# Patient Record
Sex: FEMALE | Race: WHITE | NOT HISPANIC OR LATINO | Employment: FULL TIME | URBAN - METROPOLITAN AREA
[De-identification: names, ages, dates, MRNs, and addresses within clinical notes are randomized per-mention and may not be internally consistent; named-entity substitution may affect disease eponyms.]

---

## 2017-11-25 ENCOUNTER — OFFICE VISIT (OUTPATIENT)
Dept: URGENT CARE | Facility: URGENT CARE | Age: 62
End: 2017-11-25
Payer: COMMERCIAL

## 2017-11-25 VITALS
TEMPERATURE: 97.8 F | OXYGEN SATURATION: 98 % | DIASTOLIC BLOOD PRESSURE: 80 MMHG | BODY MASS INDEX: 28.15 KG/M2 | HEART RATE: 76 BPM | WEIGHT: 164 LBS | RESPIRATION RATE: 16 BRPM | SYSTOLIC BLOOD PRESSURE: 120 MMHG

## 2017-11-25 DIAGNOSIS — J06.9 VIRAL URI WITH COUGH: Primary | ICD-10-CM

## 2017-11-25 LAB
DEPRECATED S PYO AG THROAT QL EIA: NORMAL
SPECIMEN SOURCE: NORMAL

## 2017-11-25 PROCEDURE — 99213 OFFICE O/P EST LOW 20 MIN: CPT | Performed by: FAMILY MEDICINE

## 2017-11-25 PROCEDURE — 87880 STREP A ASSAY W/OPTIC: CPT | Performed by: PHYSICIAN ASSISTANT

## 2017-11-25 PROCEDURE — 87081 CULTURE SCREEN ONLY: CPT | Performed by: PHYSICIAN ASSISTANT

## 2017-11-25 RX ORDER — BENZONATATE 200 MG/1
100-200 CAPSULE ORAL 3 TIMES DAILY PRN
Qty: 21 CAPSULE | Refills: 0 | Status: SHIPPED | OUTPATIENT
Start: 2017-11-25 | End: 2019-02-04

## 2017-11-25 RX ORDER — PROMETHAZINE HYDROCHLORIDE AND CODEINE PHOSPHATE 6.25; 1 MG/5ML; MG/5ML
5 SYRUP ORAL 4 TIMES DAILY PRN
Qty: 280 ML | Refills: 0 | Status: SHIPPED | OUTPATIENT
Start: 2017-11-25 | End: 2019-02-04

## 2017-11-25 NOTE — PATIENT INSTRUCTIONS
Continue Dayquil and Nyquil    Take tessalon for additional help with cough    Try pherergan with codeine - caution as this can cause sedation    Continue good oral hydration with fluids    If no improvement in the next 3-5 days return for evaluation

## 2017-11-25 NOTE — MR AVS SNAPSHOT
"              After Visit Summary   11/25/2017    Kim Sparrow    MRN: [id number]           Patient Information     Date Of Birth          1955        Visit Information        Provider Department      11/25/2017 1:25 PM Earl Beaulieu MD [facility]        Today's Diagnoses     Viral URI with cough    -  1    Acute pharyngitis, unspecified etiology          Care Instructions    Continue Dayquil and Nyquil    Take tessalon for additional help with cough    Try pherergan with codeine - caution as this can cause sedation    Continue good oral hydration with fluids    If no improvement in the next 3-5 days return for evaluation          Follow-ups after your visit        Who to contact     If you have questions or need follow up information about today's clinic visit or your schedule please contact [facility] directly at [phone].  Normal or non-critical lab and imaging results will be communicated to you by CC videohart, letter or phone within 4 business days after the clinic has received the results. If you do not hear from us within 7 days, please contact the clinic through CC videohart or phone. If you have a critical or abnormal lab result, we will notify you by phone as soon as possible.  Submit refill requests through "[employer] or call your pharmacy and they will forward the refill request to us. Please allow 3 business days for your refill to be completed.          Additional Information About Your Visit        MyChart Information     "[employer] lets you send messages to your doctor, view your test results, renew your prescriptions, schedule appointments and more. To sign up, go to [email or web address] . Click on \"Log in\" on the left side of the screen, which will take you to the Welcome page. Then click on \"Sign up Now\" on the right side of the page.     You will be asked to enter the access code listed below, as well as some personal information. Please follow the " directions to create your username and password.     Your access code is: [email or web address]  Expires: 2018  2:06 PM     Your access code will  in 90 days. If you need help or a new code, please call your [place] clinic or [phone].        Care EveryWhere ID     This is your Care EveryWhere ID. This could be used by other organizations to access your [place] medical records  [id number]        Your Vitals Were     Pulse Temperature Respirations Pulse Oximetry BMI (Body Mass Index)       76 97.8  F (36.6  C) (Oral) 16 98% 28.15 kg/m2        Blood Pressure from Last 3 Encounters:   17 120/80   16 114/70   02/11/15 122/75    Weight from Last 3 Encounters:   17 164 lb (74.4 kg)   16 164 lb 11.2 oz (74.7 kg)   02/11/15 158 lb 4.8 oz (71.8 kg)              We Performed the Following     Beta strep group A culture     Rapid strep screen          Today's Medication Changes          These changes are accurate as of: 17  2:06 PM.  If you have any questions, ask your nurse or doctor.               Start taking these medicines.        Dose/Directions    benzonatate 200 MG capsule   Commonly known as:  TESSALON   Used for:  Viral URI with cough   Started by:  Earl Beaulieu MD        Dose:  100-200 mg   Take 0.5-1 capsules (100-200 mg) by mouth 3 times daily as needed for cough   Quantity:  21 capsule   Refills:  0       promethazine-codeine 6.25-10 MG/5ML syrup   Commonly known as:  PHENERGAN with codeine   Used for:  Viral URI with cough   Started by:  Earl Beaulieu MD        Dose:  5 mL   Take 5 mLs by mouth 4 times daily as needed for cough   Quantity:  280 mL   Refills:  0            Where to get your medicines      These medications were sent to [id number] - [place], [place] - [address]  [address] [address]     Phone:  [phone]     benzonatate 200 MG capsule         Some of these will need a paper prescription and others can be  bought over the counter.  Ask your nurse if you have questions.     Bring a paper prescription for each of these medications     promethazine-codeine 6.25-10 MG/5ML syrup                Primary Care Provider Office Phone # Fax #    Eun Harper [phone] [address] [place] [address]        Equal Access to Services     [facility]: Hadii aad ku hadasho Soomaali, waaxda luqadaha, qaybta kaalmada adeegyada, waxay idiin hayaan adeeg kharash la'aan . So [facility] [phone].    ATENCIÓN: Si habla español, tiene a champion disposición servicios gratuitos de asistencia lingüística. Llame al [phone].    We comply with applicable federal civil rights laws and [place] laws. We do not discriminate on the basis of race, color, national origin, age, disability, sex, sexual orientation, or gender identity.            Thank you!     Thank you for choosing [facility]  for your care. Our goal is always to provide you with excellent care. Hearing back from our patients is one way we can continue to improve our services. Please take a few minutes to complete the written survey that you may receive in the mail after your visit with us. Thank you!             Your Updated Medication List - Protect others around you: Learn how to safely use, store and throw away your medicines at www.disposemymeds.org.          This list is accurate as of: 11/25/17  2:06 PM.  Always use your most recent med list.                   Brand Name Dispense Instructions for use Diagnosis    aspirin 81 MG tablet     30 tablet    Take 1 tablet (81 mg) by mouth daily        benzonatate 200 MG capsule    TESSALON    21 capsule    Take 0.5-1 capsules (100-200 mg) by mouth 3 times daily as needed for cough    Viral URI with cough       METAMUCIL 0.52 G capsule   Generic drug:  psyllium     540 capsule    Take 1 capsule (0.52 g) by mouth daily        promethazine-codeine 6.25-10 MG/5ML syrup    PHENERGAN  with codeine    280 mL    Take 5 mLs by mouth 4 times daily as needed for cough    Viral URI with cough

## 2017-11-25 NOTE — NURSING NOTE
"Kim Sparrow is a 61 year old female.      Chief Complaint   Patient presents with     Urgent Care     Sick     pt is here for a ST and cold that started last Sunday -pain in her ribs for coughing and back pain       Initial /80 (BP Location: Right arm, Cuff Size: Adult Large)  Pulse 76  Temp 97.8  F (36.6  C) (Oral)  Resp 16  Wt 164 lb (74.4 kg)  SpO2 98%  BMI 28.15 kg/m2 Estimated body mass index is 28.15 kg/(m^2) as calculated from the following:    Height as of 6/9/16: 5' 4\" (1.626 m).    Weight as of this encounter: 164 lb (74.4 kg).  Medication Reconciliation: complete      Questioned patient about current smoking habits.  Pt. quit smoking some time ago.      Aleida Hernandez CMA      "

## 2017-11-25 NOTE — PROGRESS NOTES
Subjective:   Kim Sparrow is a 61 year old female who presents for   Chief Complaint   Patient presents with     Urgent Care     Sick     pt is here for a ST and cold that started last Sunday -pain in her ribs for coughing and back pain   runny nose on Sunday heron tprogressed to cold-like symptoms weds and have been worsening.  Coughing does cause discomfort especially to her ribs but no shortness of breath. Not take any other medications for pain other than the tylenol that comes with the dayquil. No fevers or myalgias.   Did have wheeziness last night while talking and then laying down.   Taking nyquil which helps her achieve good sleep.    is also sick with similar symptoms.     SH: non-smoker  PMHX/PSHX/MEDS/ALLERGIES/SHX/FHX reviewed and updated in Epic.    Patient Active Problem List    Diagnosis Date Noted     Adenomatous colon polyp 11/19/2014     Priority: Medium     Return for colonoscopy 2019       Diverticula of colon 10/29/2014     Priority: Medium     Diverticulitis 08/06/2014     Priority: Medium     Diverticulitis of colon 09/07/2012     Priority: Medium     Advanced directives, counseling/discussion 08/04/2011     Priority: Medium     Advance Directive Problem List Overview:   Name Relationship Phone    Primary Health Care Agent            Alternative Health Care Agent          Discussed advance care planning with patient; however, patient declined at this time. 8/4/2011        Breast radiologic abnormality 08/04/2011     Priority: Medium     Chronic left breast calcification; follows with [facility]       HYPERLIPIDEMIA LDL GOAL <130 02/10/2010     Priority: Medium       Current Outpatient Prescriptions   Medication     psyllium (METAMUCIL) 0.52 G capsule     aspirin 81 MG tablet     benzonatate (TESSALON) 200 MG capsule     promethazine-codeine (PHENERGAN WITH CODEINE) 6.25-10 MG/5ML syrup     No current facility-administered medications for this visit.      ROS:  As above per  HPI    Objective:   /80 (BP Location: Right arm, Cuff Size: Adult Large)  Pulse 76  Temp 97.8  F (36.6  C) (Oral)  Resp 16  Wt 164 lb (74.4 kg)  SpO2 98%  BMI 28.15 kg/m2, Body mass index is 28.15 kg/(m^2).  Gen:  NAD, well-nourished, sitting in chair comfortably  HEENT: EOMI, PERRL sclera anicteric, Head normocephalic, ; nares patent; oropharynx pink and moist, minimal redness of the right side of her posterior pharynx, uvula is midline and no evidence of trismus  Neck: trachea midline  CV:  RRR  - no murmurs, rubs, or gallups,   Pulm:  CTAB with good aeration in all lung fields, no wheezes/rales/rhonchi, no increased work of breathing   Extrem: no cyanosis, edema or clubbing  Skin: no obvious rashes or abnormalities  Psych: Euthymic, linear thoughts, normal rate of speech       Results for orders placed or performed in visit on 11/25/17   Rapid strep screen   Result Value Ref Range    Specimen Description Throat     Rapid Strep A Screen       NEGATIVE: No Group A streptococcal antigen detected by immunoassay, await culture report.     Assessment & Plan:   Kim Sparrow, 61 year old female who presents with:    Viral URI with cough  Pain with cough is quite violent for her, I'll prescribe phenergan with codeine (QID PRN) to help with pain relief, we discussed the side effects including drowsiness. Tessalon was also given as another backup to help reduce cough symptoms. Normal vital signs here and no evidence of pneumonia. Continue good oral hydration along with dayquil/nyquil. Less likely to be flu and if it were to be this would be a mild presentation, also out of treatment window for oseltamivir if so.   - benzonatate (TESSALON) 200 MG capsule  Dispense: 21 capsule; Refill: 0  - promethazine-codeine (PHENERGAN WITH CODEINE) 6.25-10 MG/5ML syrup  Dispense: 280 mL; Refill: 0    Earl Beaulieu MD PGY3  [phone] (Pager)  [facility] [place]    Options for treatment and/or follow-up care were  reviewed with the patient. Kim Sparrow and/or legal guardian was engaged and actively involved in the decision making process. Patient/guardian verbalized understanding of the options discussed and was satisfied with the final plan.

## 2017-11-26 LAB
BACTERIA SPEC CULT: NORMAL
SPECIMEN SOURCE: NORMAL

## 2018-02-19 ENCOUNTER — TELEPHONE (OUTPATIENT)
Dept: PEDIATRICS | Facility: CLINIC | Age: 63
End: 2018-02-19

## 2018-02-19 DIAGNOSIS — S63.259S DISLOCATION OF FINGER, SEQUELA: Primary | ICD-10-CM

## 2018-02-19 NOTE — TELEPHONE ENCOUNTER
Patient calling that she had dislocated fingers and stitches. States when she called the [facility] they felt she should see a hand surgeon. Please advise. [phone] ok to lizz.

## 2018-02-22 ENCOUNTER — TRANSFERRED RECORDS (OUTPATIENT)
Dept: HEALTH INFORMATION MANAGEMENT | Facility: CLINIC | Age: 63
End: 2018-02-22

## 2018-03-01 ENCOUNTER — TRANSFERRED RECORDS (OUTPATIENT)
Dept: HEALTH INFORMATION MANAGEMENT | Facility: CLINIC | Age: 63
End: 2018-03-01

## 2018-03-22 ENCOUNTER — TRANSFERRED RECORDS (OUTPATIENT)
Dept: HEALTH INFORMATION MANAGEMENT | Facility: CLINIC | Age: 63
End: 2018-03-22

## 2019-01-10 ENCOUNTER — TELEPHONE (OUTPATIENT)
Dept: ONCOLOGY | Facility: CLINIC | Age: 64
End: 2019-01-10

## 2019-01-10 NOTE — TELEPHONE ENCOUNTER
ONCOLOGY INTAKE: Records Information      APPT INFORMATION:  Referring provider:  Self Referred, seeking 2nd Opinion  Referring provider s clinic:  Pt of [facility]  Reason for visit/diagnosis:  Lung Cancer    Were the records received with the referral (via [employer])? No, sb pt    Has patient been seen for any external appt for this diagnosis (enter clinic/location)? [facility] only

## 2019-02-01 NOTE — TELEPHONE ENCOUNTER
RECORDS STATUS - ALL OTHER DIAGNOSIS      RECORDS RECEIVED FROM: [facility] in [facility]   DATE RECEIVED: 2/2/19   NOTES STATUS DETAILS   OFFICE NOTE from referring provider 1/30/19 In [facility]     OFFICE NOTE from medical oncologist 1/31/19 In [facility]     DISCHARGE SUMMARY from hospital In [facility] In [facility]     DISCHARGE REPORT from the ER In [facility] In [facility]     OPERATIVE REPORT 1/16/19 CT Lung Bx  12/31/18 IR Port Placement In Epic/CE  In Epic/CE     MEDICATION LIST In Epic/CE In [facility]     CLINICAL TRIAL TREATMENTS TO DATE In [facility] In [facility]     LABS     PATHOLOGY REPORTS 1/16/19  In Epic/CE     ANYTHING RELATED TO DIAGNOSIS 1/16/19 XR Chest  12/12/18 PFT In Epic/CE  In Epic/CE     GENONOMIC TESTING     TYPE: In Epic/CE In Epic/CE   IMAGING (NEED IMAGES & REPORT)     CT SCANS 1/16/19 CT Lung Bx  1/8/19 CT Chest  12/12/18 Chest In Epic/CE  In [facility]  In [facility]   MRI 12/17/18 Brain In Epic/CE   MAMMO 3/27/18 In Epic/CE   ULTRASOUND None None   PET 12/21/18 In Epic/CE

## 2019-02-04 ENCOUNTER — PRE VISIT (OUTPATIENT)
Dept: ONCOLOGY | Facility: CLINIC | Age: 64
End: 2019-02-04

## 2019-02-04 ENCOUNTER — ONCOLOGY VISIT (OUTPATIENT)
Dept: ONCOLOGY | Facility: CLINIC | Age: 64
End: 2019-02-04
Attending: INTERNAL MEDICINE
Payer: COMMERCIAL

## 2019-02-04 VITALS
RESPIRATION RATE: 16 BRPM | DIASTOLIC BLOOD PRESSURE: 82 MMHG | BODY MASS INDEX: 26.29 KG/M2 | HEART RATE: 103 BPM | WEIGHT: 154 LBS | HEIGHT: 64 IN | SYSTOLIC BLOOD PRESSURE: 120 MMHG | OXYGEN SATURATION: 97 %

## 2019-02-04 DIAGNOSIS — C34.91 ADENOCARCINOMA OF RIGHT LUNG (H): ICD-10-CM

## 2019-02-04 PROCEDURE — G0463 HOSPITAL OUTPT CLINIC VISIT: HCPCS

## 2019-02-04 PROCEDURE — 99205 OFFICE O/P NEW HI 60 MIN: CPT | Performed by: INTERNAL MEDICINE

## 2019-02-04 RX ORDER — DEXAMETHASONE 4 MG/1
TABLET ORAL
Refills: 0 | COMMUNITY
Start: 2019-01-22 | End: 2022-06-08

## 2019-02-04 RX ORDER — PROCHLORPERAZINE MALEATE 10 MG
10 TABLET ORAL
COMMUNITY
Start: 2019-01-29 | End: 2022-06-08

## 2019-02-04 RX ORDER — LORAZEPAM 0.5 MG/1
TABLET ORAL
COMMUNITY
Start: 2018-12-20 | End: 2023-01-01

## 2019-02-04 RX ORDER — OXYCODONE HYDROCHLORIDE 5 MG/1
5 TABLET ORAL EVERY 6 HOURS PRN
COMMUNITY
Start: 2019-01-29 | End: 2023-01-01

## 2019-02-04 RX ORDER — ONDANSETRON 8 MG/1
4 TABLET, FILM COATED ORAL DAILY PRN
COMMUNITY
Start: 2019-01-29

## 2019-02-04 ASSESSMENT — MIFFLIN-ST. JEOR: SCORE: 1238.54

## 2019-02-04 ASSESSMENT — PAIN SCALES - GENERAL: PAINLEVEL: SEVERE PAIN (6)

## 2019-02-04 NOTE — TELEPHONE ENCOUNTER
MEET @ [facility] [facility] re: img's     Spoke w/ [place] @ [employer] - request not rec'd Refaxed request.   11:48 AM

## 2019-02-04 NOTE — Clinical Note
2/4/2019         RE: Kim Sparrow  [address] [place] [address]        Dear Colleague,    Thank you for referring your patient, Kim Sparrow, to the [facility]. Please see a copy of my visit note below.    Visit Date:   02/04/2019      This is a self referral for lung cancer.      Mrs. Sparrow is a 63-year-old female with lung adenocarcinoma.  Patient is getting her care in [facility].  She would like a second opinion.  Investigations are reviewed and summarized below.   1.  Chest x-ray on 12/10/2018 for cough and chest discomfort revealed a nodular opacity in right upper lobe.     2.  CT chest on 12/12/2018 revealed 3.9 cm right upper lobe mass abutting the minor and major fissure with extensive right-sided pleural nodularity.   3.  MRI brain on 12/17/2018 did not reveal any evidence of brain metastasis.   4.  Bronchoscopy and biopsy was done on 12/18/2018.  Transbronchial biopsy of right upper lobe mass revealed pulmonary adenocarcinoma.   -PD-L1 negative.   5.  PET scan was done on 12/21/2018.  It reveals hypermetabolic right upper lobe mass abutting the major and minor fissure.  There is small area of nodularity that appeared to be pleural based along the right middle lobe with moderate activity.  No lymph node metastasis.  No evidence of bone metastasis.  No metastatic disease in the abdomen or pelvis.   6.  EBUS was done on 12/27/2018.  Subcarinal (station 7) and right pretracheal (station R4) is positive for malignancy.   7.  CT-guided biopsy of right lung mass was done on 01/16/2019.  Pathology reveals moderately differentiated adenocarcinoma.   -PD-L1 staining is 1+. 15% staining.   -No EGFR mutation of exon 18,19 and 21.  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      Patient was seen by medical oncologist and radiation oncologist.  Patient was staged as stage III (T2 N2 M0) lung adenocarcinoma.  Patient has been started on concurrent chemoradiation.  She is getting weekly  carboplatin and Taxol with radiation.  She is tolerating the treatment well.      REVIEW OF SYSTEMS:  Overall, she is doing good.  No headache.  No dizziness.  No visual problem.  No chest pain.  No abdominal pain, nausea or vomiting.  Appetite is good.  No urinary or bowel complaints.  No bleeding.  No fever, chills or night sweats.  No recent weight loss.  All other review of systems negative.      ALLERGIES:  NONE.      MEDICATIONS:  Reviewed.      PAST MEDICAL HISTORY:   1.  Stage III right lung adenocarcinoma as described above.   2.  Hypercholesterolemia.   3.  Hyperlipidemia.   4.  Diverticulitis.   5.  Tubal ligation.   6.  Colectomy.        SOCIAL HISTORY:    -She is .   -No history of smoking.   -Occasional alcohol use.      FAMILY HISTORY:   -No history of cancer in her parents or siblings.      PHYSICAL EXAMINATION:   GENERAL:  She is alert and oriented x 3.   VITAL SIGNS:  Reviewed.  ECOG PS of 1.   Rest of the systems not examined.      LABORATORY DATA:  Reviewed from Care Everywhere.      ASSESSMENT:  A 63-year-old female with a stage IIIA (T2 N2 M0) right lung adenocarcinoma.      RECOMMENDATIONS:   1.  I had a long discussion with the patient and her family members.  I reviewed the result of CT scan, brain MRI.  PET scan.  Pathology was explained in detail.  Patient has lung adenocarcinoma.  Based on the scans, she has stage 3 A disease.  On the PET scan, there is some nodularity seen on the pleura. ? malignant or nonspecific. That can change the staging.    2.  I had a long discussion regarding treatment. Patient has stage III disease.  Surgery is generally not recommended for stage III disease. Stage III is treated with chemo-radiation.      Patient has been started on concurrent chemoradiation.  I agree with the current treatment plan.  I explained to them that concurrent chemoradiation is standard treatment for stage III lung adenocarcinoma.  There are different chemotherapy, which is  used with radiation. Patient is on weekly carboplatin and Taxol.  She is tolerating well.       After the patient is done with concurrent chemoradiation, she will be getting 1 year of immunotherapy with durvalumab.  Based on the PACIFIC trial published in [place] Journal of Medicine, one year of durvalumab improves both  progression-free survival.        I explained to the patient that this treatment will be curative in about one-third of the patients. I am hoping that she will do well without recurrence. If she recur, further treatment will be given.      We also discussed regarding treatment clinical trial.  At this time, we do not have any clinical trial available for her.      3.  Patient and family had multiple questions, which were all answered. Patient will continue her treatment under the care of Dr. Lai in [facility] system.      Total face-to-face time spent 60 minutes, more than 50% of the time spent in counseling and coordination of care.         NATHAN SOLANO MD             D: 02/10/2019   T: 02/10/2019   MT: PJ      Name:     MARY KAY STAUFFER   MRN:      [id number]        Account:      [id number]   :      1955           Visit Date:   2019      Document: [id number]        Again, thank you for allowing me to participate in the care of your patient.        Sincerely,        Nathan Solano MD

## 2019-02-05 NOTE — TELEPHONE ENCOUNTER
Received path slides from [facility] & sent to [facility] path dept for review.    Case #OO-   (1 slide, 1 report)  Case #OO- (4 slides, 1 report)  Case #SS-  (23 slides, 1 report)

## 2019-02-06 PROCEDURE — 00000346 ZZHCL STATISTIC REVIEW OUTSIDE SLIDES TC 88321: Performed by: INTERNAL MEDICINE

## 2019-02-07 LAB — COPATH REPORT: NORMAL

## 2019-02-08 LAB — COPATH REPORT: NORMAL

## 2019-02-10 PROBLEM — C34.91 ADENOCARCINOMA OF RIGHT LUNG (H): Status: ACTIVE | Noted: 2019-02-10

## 2019-02-10 NOTE — PROGRESS NOTES
Visit Date:   02/04/2019      This is a self referral for lung cancer.      Mrs. Sparrow is a 63-year-old female with lung adenocarcinoma.  Patient is getting her care in [facility].  She would like a second opinion.  Investigations are reviewed and summarized below.   1.  Chest x-ray on 12/10/2018 for cough and chest discomfort revealed a nodular opacity in right upper lobe.     2.  CT chest on 12/12/2018 revealed 3.9 cm right upper lobe mass abutting the minor and major fissure with extensive right-sided pleural nodularity.   3.  MRI brain on 12/17/2018 did not reveal any evidence of brain metastasis.   4.  Bronchoscopy and biopsy was done on 12/18/2018.  Transbronchial biopsy of right upper lobe mass revealed pulmonary adenocarcinoma.   -PD-L1 negative.   5.  PET scan was done on 12/21/2018.  It reveals hypermetabolic right upper lobe mass abutting the major and minor fissure.  There is small area of nodularity that appeared to be pleural based along the right middle lobe with moderate activity.  No lymph node metastasis.  No evidence of bone metastasis.  No metastatic disease in the abdomen or pelvis.   6.  EBUS was done on 12/27/2018.  Subcarinal (station 7) and right pretracheal (station R4) is positive for malignancy.   7.  CT-guided biopsy of right lung mass was done on 01/16/2019.  Pathology reveals moderately differentiated adenocarcinoma.   -PD-L1 staining is 1+. 15% staining.   -No EGFR mutation of exon 18,19 and 21.  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      Patient was seen by medical oncologist and radiation oncologist.  Patient was staged as stage III (T2 N2 M0) lung adenocarcinoma.  Patient has been started on concurrent chemoradiation.  She is getting weekly carboplatin and Taxol with radiation.  She is tolerating the treatment well.      REVIEW OF SYSTEMS:  Overall, she is doing good.  No headache.  No dizziness.  No visual problem.  No chest pain.  No abdominal pain, nausea or vomiting.   Appetite is good.  No urinary or bowel complaints.  No bleeding.  No fever, chills or night sweats.  No recent weight loss.  All other review of systems negative.      ALLERGIES:  NONE.      MEDICATIONS:  Reviewed.      PAST MEDICAL HISTORY:   1.  Stage III right lung adenocarcinoma as described above.   2.  Hypercholesterolemia.   3.  Hyperlipidemia.   4.  Diverticulitis.   5.  Tubal ligation.   6.  Colectomy.        SOCIAL HISTORY:    -She is .   -No history of smoking.   -Occasional alcohol use.      FAMILY HISTORY:   -No history of cancer in her parents or siblings.      PHYSICAL EXAMINATION:   GENERAL:  She is alert and oriented x 3.   VITAL SIGNS:  Reviewed.  ECOG PS of 1.   Rest of the systems not examined.      LABORATORY DATA:  Reviewed from Care Everywhere.      ASSESSMENT:  A 63-year-old female with a stage IIIA (T2 N2 M0) right lung adenocarcinoma.      RECOMMENDATIONS:   1.  I had a long discussion with the patient and her family members.  I reviewed the result of CT scan, brain MRI.  PET scan.  Pathology was explained in detail.  Patient has lung adenocarcinoma.  Based on the scans, she has stage 3 A disease.  On the PET scan, there is some nodularity seen on the pleura. ? malignant or nonspecific. That can change the staging.    2.  I had a long discussion regarding treatment. Patient has stage III disease.  Surgery is generally not recommended for stage III disease. Stage III is treated with chemo-radiation.      Patient has been started on concurrent chemoradiation.  I agree with the current treatment plan.  I explained to them that concurrent chemoradiation is standard treatment for stage III lung adenocarcinoma.  There are different chemotherapy, which is used with radiation. Patient is on weekly carboplatin and Taxol.  She is tolerating well.       After the patient is done with concurrent chemoradiation, she will be getting 1 year of immunotherapy with durvalumab.  Based on the PACIFIC  trial published in [place] Journal of Medicine, one year of durvalumab improves both  progression-free survival.        I explained to the patient that this treatment will be curative in about one-third of the patients. I am hoping that she will do well without recurrence. If she recur, further treatment will be given.      We also discussed regarding treatment clinical trial.  At this time, we do not have any clinical trial available for her.      3.  Patient and family had multiple questions, which were all answered. Patient will continue her treatment under the care of Dr. Lai in [facility] system.      Total face-to-face time spent 60 minutes, more than 50% of the time spent in counseling and coordination of care.         NATHAN SOLANO MD             D: 02/10/2019   T: 02/10/2019   MT: PJ      Name:     MARY KAY STAUFFER   MRN:      [id number]        Account:      [id number]   :      1955           Visit Date:   2019      Document: [id number]

## 2021-05-25 ENCOUNTER — RECORDS - HEALTHEAST (OUTPATIENT)
Dept: ADMINISTRATIVE | Facility: CLINIC | Age: 66
End: 2021-05-25

## 2021-09-04 ENCOUNTER — HEALTH MAINTENANCE LETTER (OUTPATIENT)
Age: 66
End: 2021-09-04

## 2021-12-27 ENCOUNTER — LAB REQUISITION (OUTPATIENT)
Dept: LAB | Facility: CLINIC | Age: 66
End: 2021-12-27
Payer: MEDICARE

## 2021-12-27 DIAGNOSIS — Z20.822 CONTACT WITH AND (SUSPECTED) EXPOSURE TO COVID-19: ICD-10-CM

## 2021-12-27 PROCEDURE — U0005 INFEC AGEN DETEC AMPLI PROBE: HCPCS | Mod: ORL | Performed by: NURSE PRACTITIONER

## 2021-12-28 LAB — SARS-COV-2 RNA RESP QL NAA+PROBE: NEGATIVE

## 2022-01-01 ENCOUNTER — TELEPHONE (OUTPATIENT)
Dept: ANTICOAGULATION | Facility: CLINIC | Age: 67
End: 2022-01-01

## 2022-01-01 ENCOUNTER — TELEPHONE (OUTPATIENT)
Dept: NURSING | Facility: CLINIC | Age: 67
End: 2022-01-01

## 2022-01-01 ENCOUNTER — IMMUNIZATION (OUTPATIENT)
Dept: FAMILY MEDICINE | Facility: CLINIC | Age: 67
End: 2022-01-01
Payer: MEDICARE

## 2022-01-01 ENCOUNTER — ANTICOAGULATION THERAPY VISIT (OUTPATIENT)
Dept: ANTICOAGULATION | Facility: CLINIC | Age: 67
End: 2022-01-01

## 2022-01-01 ENCOUNTER — TELEPHONE (OUTPATIENT)
Dept: PEDIATRICS | Facility: CLINIC | Age: 67
End: 2022-01-01

## 2022-01-01 ENCOUNTER — LAB (OUTPATIENT)
Dept: LAB | Facility: CLINIC | Age: 67
End: 2022-01-01
Payer: MEDICARE

## 2022-01-01 ENCOUNTER — OFFICE VISIT (OUTPATIENT)
Dept: PEDIATRICS | Facility: CLINIC | Age: 67
End: 2022-01-01
Payer: MEDICARE

## 2022-01-01 ENCOUNTER — MYC MEDICAL ADVICE (OUTPATIENT)
Dept: PEDIATRICS | Facility: CLINIC | Age: 67
End: 2022-01-01

## 2022-01-01 ENCOUNTER — MYC MEDICAL ADVICE (OUTPATIENT)
Dept: PEDIATRICS | Facility: CLINIC | Age: 67
End: 2022-01-01
Payer: MEDICARE

## 2022-01-01 ENCOUNTER — MYC MEDICAL ADVICE (OUTPATIENT)
Dept: ANTICOAGULATION | Facility: CLINIC | Age: 67
End: 2022-01-01

## 2022-01-01 ENCOUNTER — HEALTH MAINTENANCE LETTER (OUTPATIENT)
Age: 67
End: 2022-01-01

## 2022-01-01 ENCOUNTER — VIRTUAL VISIT (OUTPATIENT)
Dept: HEMATOLOGY | Facility: CLINIC | Age: 67
End: 2022-01-01
Attending: STUDENT IN AN ORGANIZED HEALTH CARE EDUCATION/TRAINING PROGRAM
Payer: MEDICARE

## 2022-01-01 ENCOUNTER — NURSE TRIAGE (OUTPATIENT)
Dept: NURSING | Facility: CLINIC | Age: 67
End: 2022-01-01

## 2022-01-01 VITALS
WEIGHT: 123 LBS | OXYGEN SATURATION: 96 % | DIASTOLIC BLOOD PRESSURE: 60 MMHG | HEIGHT: 63 IN | SYSTOLIC BLOOD PRESSURE: 120 MMHG | TEMPERATURE: 97.1 F | BODY MASS INDEX: 21.79 KG/M2 | HEART RATE: 99 BPM

## 2022-01-01 VITALS
OXYGEN SATURATION: 95 % | BODY MASS INDEX: 22.79 KG/M2 | HEART RATE: 107 BPM | DIASTOLIC BLOOD PRESSURE: 100 MMHG | TEMPERATURE: 97 F | HEIGHT: 63 IN | WEIGHT: 128.6 LBS | SYSTOLIC BLOOD PRESSURE: 150 MMHG | RESPIRATION RATE: 16 BRPM

## 2022-01-01 DIAGNOSIS — I63.10 CEREBROVASCULAR ACCIDENT (CVA) DUE TO EMBOLISM OF PRECEREBRAL ARTERY (H): ICD-10-CM

## 2022-01-01 DIAGNOSIS — I26.99 OTHER PULMONARY EMBOLISM WITHOUT ACUTE COR PULMONALE, UNSPECIFIED CHRONICITY (H): ICD-10-CM

## 2022-01-01 DIAGNOSIS — C34.91 ADENOCARCINOMA OF RIGHT LUNG (H): Primary | ICD-10-CM

## 2022-01-01 DIAGNOSIS — I63.10 CEREBROVASCULAR ACCIDENT (CVA) DUE TO EMBOLISM OF PRECEREBRAL ARTERY (H): Primary | ICD-10-CM

## 2022-01-01 DIAGNOSIS — R79.89 ELEVATED SERUM CREATININE: ICD-10-CM

## 2022-01-01 DIAGNOSIS — E03.9 PRIMARY HYPOTHYROIDISM: ICD-10-CM

## 2022-01-01 DIAGNOSIS — Z86.73 HISTORY OF STROKE: Primary | ICD-10-CM

## 2022-01-01 DIAGNOSIS — E22.2 SYNDROME OF INAPPROPRIATE SECRETION OF ANTIDIURETIC HORMONE (H): ICD-10-CM

## 2022-01-01 DIAGNOSIS — I63.9 STROKE (H): Primary | ICD-10-CM

## 2022-01-01 DIAGNOSIS — Z12.11 SCREEN FOR COLON CANCER: ICD-10-CM

## 2022-01-01 DIAGNOSIS — E03.9 PRIMARY HYPOTHYROIDISM: Primary | ICD-10-CM

## 2022-01-01 LAB
ANION GAP SERPL CALCULATED.3IONS-SCNC: 13 MMOL/L (ref 7–15)
BUN SERPL-MCNC: 20.5 MG/DL (ref 8–23)
CALCIUM SERPL-MCNC: 9.2 MG/DL (ref 8.8–10.2)
CHLORIDE SERPL-SCNC: 99 MMOL/L (ref 98–107)
CREAT SERPL-MCNC: 1.17 MG/DL (ref 0.51–0.95)
DEPRECATED HCO3 PLAS-SCNC: 18 MMOL/L (ref 22–29)
GFR SERPL CREATININE-BSD FRML MDRD: 51 ML/MIN/1.73M2
GLUCOSE SERPL-MCNC: 89 MG/DL (ref 70–99)
LMWH PPP CHRO-ACNC: 0.69 IU/ML
LMWH PPP CHRO-ACNC: 0.71 IU/ML
LMWH PPP CHRO-ACNC: 0.77 IU/ML
LMWH PPP CHRO-ACNC: 0.85 IU/ML
POTASSIUM SERPL-SCNC: 4.7 MMOL/L (ref 3.4–5.3)
SODIUM SERPL-SCNC: 130 MMOL/L (ref 136–145)
T4 FREE SERPL-MCNC: 1.27 NG/DL (ref 0.76–1.46)
TSH SERPL DL<=0.005 MIU/L-ACNC: 6.34 MU/L (ref 0.4–4)
UFH PPP CHRO-ACNC: >1.1 IU/ML
UFH PPP CHRO-ACNC: >1.1 IU/ML

## 2022-01-01 PROCEDURE — 85520 HEPARIN ASSAY: CPT

## 2022-01-01 PROCEDURE — 85520 HEPARIN ASSAY: CPT | Performed by: INTERNAL MEDICINE

## 2022-01-01 PROCEDURE — 90662 IIV NO PRSV INCREASED AG IM: CPT

## 2022-01-01 PROCEDURE — 84439 ASSAY OF FREE THYROXINE: CPT

## 2022-01-01 PROCEDURE — 80048 BASIC METABOLIC PNL TOTAL CA: CPT

## 2022-01-01 PROCEDURE — 99214 OFFICE O/P EST MOD 30 MIN: CPT | Performed by: INTERNAL MEDICINE

## 2022-01-01 PROCEDURE — 36415 COLL VENOUS BLD VENIPUNCTURE: CPT

## 2022-01-01 PROCEDURE — 99213 OFFICE O/P EST LOW 20 MIN: CPT | Performed by: STUDENT IN AN ORGANIZED HEALTH CARE EDUCATION/TRAINING PROGRAM

## 2022-01-01 PROCEDURE — G0008 ADMIN INFLUENZA VIRUS VAC: HCPCS

## 2022-01-01 PROCEDURE — 99203 OFFICE O/P NEW LOW 30 MIN: CPT | Mod: 95 | Performed by: STUDENT IN AN ORGANIZED HEALTH CARE EDUCATION/TRAINING PROGRAM

## 2022-01-01 PROCEDURE — 36415 COLL VENOUS BLD VENIPUNCTURE: CPT | Performed by: INTERNAL MEDICINE

## 2022-01-01 PROCEDURE — 84443 ASSAY THYROID STIM HORMONE: CPT

## 2022-01-01 RX ORDER — ENOXAPARIN SODIUM 100 MG/ML
40 INJECTION SUBCUTANEOUS DAILY
Qty: 54 ML | Refills: 1 | Status: CANCELLED | OUTPATIENT
Start: 2022-01-01

## 2022-01-01 RX ORDER — ATORVASTATIN CALCIUM 40 MG/1
40 TABLET, FILM COATED ORAL DAILY
COMMUNITY
End: 2023-01-01

## 2022-01-01 RX ORDER — ENOXAPARIN SODIUM 100 MG/ML
40 INJECTION SUBCUTANEOUS DAILY
COMMUNITY
End: 2022-01-01

## 2022-01-01 RX ORDER — ENOXAPARIN SODIUM 100 MG/ML
40 INJECTION SUBCUTANEOUS EVERY 24 HOURS
Qty: 14.4 ML | Refills: 3 | Status: SHIPPED | OUTPATIENT
Start: 2022-01-01 | End: 2023-01-01

## 2022-01-01 RX ORDER — MOBOCERTINIB 40 MG/1
80 CAPSULE ORAL DAILY
COMMUNITY
Start: 2022-01-01

## 2022-01-01 ASSESSMENT — PAIN SCALES - GENERAL: PAINLEVEL: MILD PAIN (2)

## 2022-06-06 SDOH — ECONOMIC STABILITY: INCOME INSECURITY: HOW HARD IS IT FOR YOU TO PAY FOR THE VERY BASICS LIKE FOOD, HOUSING, MEDICAL CARE, AND HEATING?: NOT HARD AT ALL

## 2022-06-06 SDOH — HEALTH STABILITY: PHYSICAL HEALTH: ON AVERAGE, HOW MANY MINUTES DO YOU ENGAGE IN EXERCISE AT THIS LEVEL?: 20 MIN

## 2022-06-06 SDOH — ECONOMIC STABILITY: TRANSPORTATION INSECURITY
IN THE PAST 12 MONTHS, HAS LACK OF TRANSPORTATION KEPT YOU FROM MEETINGS, WORK, OR FROM GETTING THINGS NEEDED FOR DAILY LIVING?: NO

## 2022-06-06 SDOH — ECONOMIC STABILITY: FOOD INSECURITY: WITHIN THE PAST 12 MONTHS, YOU WORRIED THAT YOUR FOOD WOULD RUN OUT BEFORE YOU GOT MONEY TO BUY MORE.: NEVER TRUE

## 2022-06-06 SDOH — ECONOMIC STABILITY: TRANSPORTATION INSECURITY
IN THE PAST 12 MONTHS, HAS THE LACK OF TRANSPORTATION KEPT YOU FROM MEDICAL APPOINTMENTS OR FROM GETTING MEDICATIONS?: NO

## 2022-06-06 SDOH — ECONOMIC STABILITY: FOOD INSECURITY: WITHIN THE PAST 12 MONTHS, THE FOOD YOU BOUGHT JUST DIDN'T LAST AND YOU DIDN'T HAVE MONEY TO GET MORE.: NEVER TRUE

## 2022-06-06 SDOH — ECONOMIC STABILITY: INCOME INSECURITY: IN THE LAST 12 MONTHS, WAS THERE A TIME WHEN YOU WERE NOT ABLE TO PAY THE MORTGAGE OR RENT ON TIME?: NO

## 2022-06-06 SDOH — HEALTH STABILITY: PHYSICAL HEALTH: ON AVERAGE, HOW MANY DAYS PER WEEK DO YOU ENGAGE IN MODERATE TO STRENUOUS EXERCISE (LIKE A BRISK WALK)?: 2 DAYS

## 2022-06-06 ASSESSMENT — LIFESTYLE VARIABLES
AUDIT-C TOTAL SCORE: 2
HOW MANY STANDARD DRINKS CONTAINING ALCOHOL DO YOU HAVE ON A TYPICAL DAY: 1 OR 2
HOW OFTEN DO YOU HAVE A DRINK CONTAINING ALCOHOL: 2-4 TIMES A MONTH
SKIP TO QUESTIONS 9-10: 1
HOW OFTEN DO YOU HAVE SIX OR MORE DRINKS ON ONE OCCASION: NEVER

## 2022-06-06 ASSESSMENT — SOCIAL DETERMINANTS OF HEALTH (SDOH)
IN A TYPICAL WEEK, HOW MANY TIMES DO YOU TALK ON THE PHONE WITH FAMILY, FRIENDS, OR NEIGHBORS?: MORE THAN THREE TIMES A WEEK
HOW OFTEN DO YOU GET TOGETHER WITH FRIENDS OR RELATIVES?: ONCE A WEEK
HOW OFTEN DO YOU ATTEND CHURCH OR RELIGIOUS SERVICES?: NEVER
DO YOU BELONG TO ANY CLUBS OR ORGANIZATIONS SUCH AS CHURCH GROUPS UNIONS, FRATERNAL OR ATHLETIC GROUPS, OR SCHOOL GROUPS?: NO

## 2022-06-08 ENCOUNTER — OFFICE VISIT (OUTPATIENT)
Dept: PEDIATRICS | Facility: CLINIC | Age: 67
End: 2022-06-08
Payer: MEDICARE

## 2022-06-08 VITALS
RESPIRATION RATE: 20 BRPM | HEART RATE: 92 BPM | SYSTOLIC BLOOD PRESSURE: 149 MMHG | TEMPERATURE: 98.6 F | OXYGEN SATURATION: 99 % | WEIGHT: 133 LBS | HEIGHT: 63 IN | BODY MASS INDEX: 23.57 KG/M2 | DIASTOLIC BLOOD PRESSURE: 87 MMHG

## 2022-06-08 DIAGNOSIS — E87.1 HYPONATREMIA: Primary | ICD-10-CM

## 2022-06-08 PROCEDURE — 36415 COLL VENOUS BLD VENIPUNCTURE: CPT | Performed by: STUDENT IN AN ORGANIZED HEALTH CARE EDUCATION/TRAINING PROGRAM

## 2022-06-08 PROCEDURE — 84295 ASSAY OF SERUM SODIUM: CPT | Performed by: STUDENT IN AN ORGANIZED HEALTH CARE EDUCATION/TRAINING PROGRAM

## 2022-06-08 PROCEDURE — 99203 OFFICE O/P NEW LOW 30 MIN: CPT | Mod: GE | Performed by: STUDENT IN AN ORGANIZED HEALTH CARE EDUCATION/TRAINING PROGRAM

## 2022-06-08 RX ORDER — LEVOTHYROXINE SODIUM 112 UG/1
112 TABLET ORAL DAILY
COMMUNITY
End: 2023-01-01

## 2022-06-08 RX ORDER — ACETAMINOPHEN 500 MG
500-1000 TABLET ORAL EVERY 6 HOURS PRN
COMMUNITY

## 2022-06-08 RX ORDER — LISINOPRIL 10 MG/1
10 TABLET ORAL DAILY
COMMUNITY
End: 2023-01-01

## 2022-06-08 RX ORDER — SODIUM CHLORIDE 1 G/1
1 TABLET ORAL 2 TIMES DAILY
COMMUNITY
End: 2023-01-01

## 2022-06-08 ASSESSMENT — ENCOUNTER SYMPTOMS: BREAST MASS: 0

## 2022-06-08 ASSESSMENT — ACTIVITIES OF DAILY LIVING (ADL): CURRENT_FUNCTION: NO ASSISTANCE NEEDED

## 2022-06-08 ASSESSMENT — PAIN SCALES - GENERAL: PAINLEVEL: NO PAIN (0)

## 2022-06-08 NOTE — PROGRESS NOTES
Assessment & Plan     Hyponatremia  Recent hospitalization for hyponatremia in the setting of COVID and known stage IV lung adenocarcinoma on Mobocertinib.  Her lowest Sodium was 122.  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  She notes that she has since dropped to 2 g taken 1/0.5/0.5.  She has been drinking 1.5 L per day   - Sodium level pending, will adjust sodium/ fluid restriction once sodium results    HCM  - Due for establish care visit   - Sees oncology at [facility] on colonoscopy and PAP although we do not seem to have records available  - She is planning to get her COVID booster when she is a little further out  - She notes that she was told by one of the members of her oncology team to wait to get her shingles vaccine (she is not sure why)      Return in about 4 weeks (around 7/6/2022) for Routine Visit/ establish care.    Sirena Cantor MD  [facility] ANNE-MARIE Alvarez is a 66 year old who presents for the following health issues:    She was admitted to the hospital from clinic on 5/25 after being found to be hyponatremic and COVID positive.  She denies symptoms of hyponatremia.  She notes some toe and finger sores that were bothersome and present for approximately 1 month (starting 1 month after starting her mobocertinib).  She notes that her breathing feels at baseline and she is not noticing any symptoms that she attributes to COVID.      She has continued a 1.5 L viola fluid restriction as well as salt tabs, but has dropped down to 2 g of salt tabs per day    She notes that she had some transient left eye swelling on the 29th that has since resolved.  She then developed R eye swelling yesterday morning and seems to be improving.   No known injury.    Review of Systems   Breasts:  Negative for tenderness, breast mass and discharge.   Genitourinary: Negative for pelvic pain, vaginal bleeding and vaginal  "discharge.      Constitutional, HEENT, cardiovascular, pulmonary, gi and gu systems are negative, except as otherwise noted.      Objective    BP (!) 149/87   Pulse 92   Temp 98.6  F (37  C) (Tympanic)   Resp 20   Ht 1.6 m (5' 3\")   Wt 60.3 kg (133 lb)   SpO2 99%   BMI 23.56 kg/m    Body mass index is 23.56 kg/m .  Physical Exam   GENERAL: healthy, alert and no distress  EYES: Eyes grossly normal to inspection, PERRL and conjunctivae and sclerae normal.  Right eye with some surrounding swelling.  Photo available in media tab  HENT: ear canals and TM's normal, nose and mouth without ulcers or lesions  NECK: no adenopathy, no asymmetry, masses, or scars and thyroid normal to palpation  RESP: lungs clear to auscultation - no rales, rhonchi or wheezes  CV: regular rate and rhythm, normal S1 S2, no S3 or S4, no murmur, click or rub, no peripheral edema and peripheral pulses strong  ABDOMEN: soft, nontender, no hepatosplenomegaly, no masses and bowel sounds normal  MS: no gross musculoskeletal defects noted, no edema  SKIN: no suspicious lesions or rashes  NEURO: Normal strength and tone, mentation intact and speech normal  PSYCH: mentation appears normal, affect normal/bright    No results found for this or any previous visit (from the past 24 hour(s)).    Physician Attestation   I, Farzaneh Somers MD, discussed the patient with the resident during the patient s visit on Jun 8, 2022, and agree with the resident s assessment and plan of care.  I was immediately available to the patient should the need have arisen.  Farzaneh Somers MD  Internal Medicine/Pediatrics  [facility]            "

## 2022-06-08 NOTE — PATIENT INSTRUCTIONS
Great meeting you today!  We will check your Sodium and make a plan of what to do with the salt tabs.

## 2022-06-09 LAB — SODIUM SERPL-SCNC: 133 MMOL/L (ref 133–144)

## 2022-06-17 DIAGNOSIS — C34.91 ADENOCARCINOMA OF LUNG, STAGE 2, RIGHT (H): Primary | ICD-10-CM

## 2022-06-30 ENCOUNTER — LAB (OUTPATIENT)
Dept: LAB | Facility: CLINIC | Age: 67
End: 2022-06-30
Payer: MEDICARE

## 2022-06-30 DIAGNOSIS — C34.91 ADENOCARCINOMA OF LUNG, STAGE 2, RIGHT (H): ICD-10-CM

## 2022-06-30 PROCEDURE — 80053 COMPREHEN METABOLIC PANEL: CPT

## 2022-06-30 PROCEDURE — 36415 COLL VENOUS BLD VENIPUNCTURE: CPT

## 2022-07-01 LAB
ALBUMIN SERPL-MCNC: 3.8 G/DL (ref 3.4–5)
ALP SERPL-CCNC: 90 U/L (ref 40–150)
ALT SERPL W P-5'-P-CCNC: 15 U/L (ref 0–50)
ANION GAP SERPL CALCULATED.3IONS-SCNC: 9 MMOL/L (ref 3–14)
AST SERPL W P-5'-P-CCNC: 22 U/L (ref 0–45)
BILIRUB SERPL-MCNC: 0.6 MG/DL (ref 0.2–1.3)
BUN SERPL-MCNC: 21 MG/DL (ref 7–30)
CALCIUM SERPL-MCNC: 9.4 MG/DL (ref 8.5–10.1)
CHLORIDE BLD-SCNC: 102 MMOL/L (ref 94–109)
CO2 SERPL-SCNC: 22 MMOL/L (ref 20–32)
CREAT SERPL-MCNC: 1.43 MG/DL (ref 0.52–1.04)
GFR SERPL CREATININE-BSD FRML MDRD: 40 ML/MIN/1.73M2
GLUCOSE BLD-MCNC: 83 MG/DL (ref 70–99)
POTASSIUM BLD-SCNC: 4.6 MMOL/L (ref 3.4–5.3)
PROT SERPL-MCNC: 7.1 G/DL (ref 6.8–8.8)
SODIUM SERPL-SCNC: 133 MMOL/L (ref 133–144)

## 2022-10-17 PROBLEM — E22.2 SYNDROME OF INAPPROPRIATE SECRETION OF ANTIDIURETIC HORMONE (H): Status: ACTIVE | Noted: 2022-05-26

## 2022-10-17 PROBLEM — Z79.01 LONG TERM CURRENT USE OF ANTICOAGULANT THERAPY: Status: ACTIVE | Noted: 2020-08-13

## 2022-10-17 PROBLEM — E03.9 PRIMARY HYPOTHYROIDISM: Status: ACTIVE | Noted: 2020-04-15

## 2022-10-17 PROBLEM — E87.1 HYPONATREMIA: Status: ACTIVE | Noted: 2022-05-25

## 2022-10-17 PROBLEM — I26.99 OTHER PULMONARY EMBOLISM WITHOUT ACUTE COR PULMONALE, UNSPECIFIED CHRONICITY (H): Status: ACTIVE | Noted: 2022-01-01

## 2022-10-17 PROBLEM — Z86.711 HISTORY OF PULMONARY EMBOLISM: Status: ACTIVE | Noted: 2020-04-12

## 2022-10-17 NOTE — PATIENT INSTRUCTIONS
So nice to meet you!    Check about the pneumonia vaccine with oncologist          SHINGLES VACCINE:  If you are over 50 I recommend the Shingrix vaccine. Two doses of Shingrix provides more than 90% protection against shingles and postherpetic neuralgia (PHN), a type of chronic pain that is the most common complication of shingles.   - even if you've had the older shingles vaccine (Zostavax) it's okay to get the new vaccine.   - even if you've had shingles before the vaccine can be helpful.    Typically the best (and cheapest!) place to get this vaccine is our pharmacy downstairs. The vaccine is a two shot series - I recommend getting the second shot 2-6 months after the first dose.    You may have a sore arm and feel mild flu-like symptoms for a day or two after the vaccine. Most people do not need to adjust their regular activities. It's okay to take Tylenol or ibuprofen if you have side effects.

## 2022-10-17 NOTE — PROGRESS NOTES
"  Assessment & Plan   Adenocarcinoma of right lung (H)  Sees [facility]. Upcoming appointment.    Screen for colon cancer  Patient goes to [facility]. Patient will check when she is next due.  - Colonoscopy Screening  Referral; Future    Other pulmonary embolism without acute cor pulmonale, unspecified chronicity (H)  On apixaban.    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 Gets symptomatic when low.        Return in about 6 months (around 4/17/2023) for Annual Wellness Exam.   -hair loss?  -colonoscopy?  -vaccines? Declined flu pneumonia and shingles vaccines.  -oxy prescription?    Jemma Alvarado MD  [facility] ANNE-MARIE Alvarez is a 66 year old, presenting for the following health issues:  Establish Care      History of Present Illness       Reason for visit:  Establishing new primary doctor as I have an oncologist treating my Cancer symtoms    She eats 2-3 servings of fruits and vegetables daily.She consumes 2 sweetened beverage(s) daily.She exercises with enough effort to increase her heart rate 9 or less minutes per day.  She exercises with enough effort to increase her heart rate 4 days per week.   She is taking medications regularly.       Undergoing treatment of lung adenocarcioma  -getting repeat PET scan, brain scan, Friday    Has cabin and boat on [place]  Goes to [place] in winter  Has 4 grandkids   retired    Works as realtor      Oxycodone had 30 at end of June: has 2 left  -as needed for back pain related to cancer  -took 2 in one day in July          Objective    BP (!) 150/100 (BP Location: Right arm, Patient Position: Chair, Cuff Size: Adult Regular)   Pulse 107   Temp 97  F (36.1  C) (Tympanic)   Resp 16   Ht 1.6 m (5' 3\")   Wt 58.3 kg (128 lb 9.6 oz)   SpO2 95%   BMI 22.78 kg/m  "   Body mass index is 22.78 kg/m .  Physical Exam   GENERAL: healthy, alert and no distress  CV: regular rate and rhythm, normal S1 S2, no S3 or S4, no murmur, click or rub, no peripheral edema and peripheral pulses strong  MS: no gross musculoskeletal defects noted, no edema

## 2022-11-01 NOTE — TELEPHONE ENCOUNTER
TSH 5.1 on 10/30/22. Was 7.8 on 10/21/22. Patient was hospitalized for strokes at [place].    Recommend no dose change for now, repeat in 6-8 weeks.  [employer] message sent to patient.    Jemma Alvarado MD  Internal Medicine & Pediatrics  [facility] [place]  She/her

## 2022-11-21 NOTE — TELEPHONE ENCOUNTER
Per Dr. Alvarado-would wait two months after starting Atorvastatin to recheck Lipid panel.  Patient was advised of this.  STEPHANIE Molina RN

## 2022-11-21 NOTE — TELEPHONE ENCOUNTER
[facility] message already received from pt and routed to provider. Closing this duplicate encounter.    Jennifer Aguilera on 11/21/2022 at 12:05 PM

## 2022-11-21 NOTE — TELEPHONE ENCOUNTER
I advised patient of labs that are ordered by Dr. Alvarado.  She states that [place] told her to recheck the Lipid panel in one month.  She began Atorvastatin 40 mg on 10/30.  Advised patient that we typically check the Lipid level two months after beginning cholesterol medication.  Will discuss with Dr. Alvarado.  STEPHANIE Molina RN

## 2022-11-21 NOTE — TELEPHONE ENCOUNTER
Signed anti-Xa level. BMP added due to elevated creatinine 1.5 in hospital on 10/30.    Jemma Alvarado MD  Internal Medicine & Pediatrics  [facility] [place]  She/her

## 2022-11-21 NOTE — TELEPHONE ENCOUNTER
Patient has hospital follow up scheduled on 11/28.  Can use an approval required spot to see me if wants to reschedule.    Jemma Avlarado MD  Internal Medicine & Pediatrics  [facility]  She/her

## 2022-11-21 NOTE — TELEPHONE ENCOUNTER
Reason for Call:  Appointment Request    Patient requesting this type of appt:  Hospital/ED Follow-Up     Requested provider: Jemma Alvarado    Reason patient unable to be scheduled: Not with their preferred provider    When does patient want to be seen/preferred time: ASAP    Comments: Had a stroke  And was discontinue 10/31 and MD would like her to have a re check with checking her anti XA levels for medication concerns.      Could we send this information to you in [facility] or would you prefer to receive a phone call?:   Patient would prefer a phone call   Okay to leave a detailed message?: Yes at Home number on file [phone] (home)    Call taken on 11/21/2022 at 8:32 AM by Sheila Abbasi PTA

## 2022-11-21 NOTE — TELEPHONE ENCOUNTER
Can see [facility] visit on 10/31 in Care Everywhere.    Jennifer Aguilera on 11/21/2022 at 11:56 AM

## 2022-11-21 NOTE — TELEPHONE ENCOUNTER
Please see My Chart message from patient for clarification.  Please review order, and sign if in agreement.   (Lab that was drawn at [facility]-Heparin Anti-Xa, P)  Any other labs you would like done?    Patient takes Lovenox at 0800.   I scheduled a lab only appointment at 12 pm for her on 11/23.  TSH level was 5.1 at [facility] on 10/30.    11/07/22-T4-1.27.  TSH   Date Value Ref Range Status   11/07/2022 6.34 (H) 0.40 - 4.00 mU/L Final   Per lab result note-Your thyroid test was normal.  The TSH (thyroid stimulating hormone) was slightly elevated, but the thyroid hormone level T4 is normal. This can be re-checked in 3-6 months to see that it remains in the normal range.  STEPHANIE Molina RN

## 2022-11-24 NOTE — TELEPHONE ENCOUNTER
[facility] calling with critical lab. They were directed to call on call provider for [place].     CLAUDIA BENTLEY RN  [facility] nurse advisors  11/23/2022  9:18 PM    
Appropriate/Calm/Playful

## 2022-11-24 NOTE — TELEPHONE ENCOUNTER
Anti Xa level high.  Recommend decreasing to 40mg BID (20% decrease) and recheck Friday or Saturday.    Recommend hematology evaluation for long term management.    Jemma Alvarado MD  Internal Medicine & Pediatrics  [facility] [place]  She/her

## 2022-11-25 NOTE — TELEPHONE ENCOUNTER
Pt sent [facility] message requesting sooner lab-only appointment. Called pt back to get details. She decreased to 40mg BID on Wed. She administered the inj this morning at 8:15 am & need to check the labs in 4-6 hrs after inj(between 12:15 & 2:15 pm).    Scheduled a lab-only appointment at 1 pm today.     MIKALA Schmidt  Patient Advocate Liason (PAL)  [facility]  Ph. [phone] / Fax. [phone]

## 2022-11-26 NOTE — TELEPHONE ENCOUNTER
Critical lab result: Heparin Unfractionated anti 10A level = >1.10. Drawn @ 12:57pm yesterday. 3 days ago , also >1.10.  Ordered by Dr Jemma Alvarado @ [facility].   DX: other pulmonary embolism without acute cor pulmonale, unspecified chronicity.   Dr Kim Frederick on call, paged via am com @ 6pm. Contact patient:   Lovenox should be changed to once daily dosing. Follow up with Dr Alvarado on Monday.   Contacted patient @ 6:09pm: she verbalized understanding and states she will be in the clinic on Monday.     Juliette Fernandez RN Triage Nurse Advisor 6:10 PM 11/26/2022    Reason for Disposition    Lab or radiology calling with CRITICAL test results    Additional Information    Negative: Lab calling with strep throat test results and triager can call in prescription    Negative: Lab calling with urinalysis test results and triager can call in prescription    Negative: Medication questions    Negative: Medication renewal and refill questions    Negative: Pre-operative or pre-procedural questions    Negative: ED call to PCP (i.e., primary care provider; doctor, NP, or PA)    Negative: Doctor (or NP/PA) call to PCP    Negative: Call about patient who is currently hospitalized    Protocols used: PCP CALL - NO TRIAGE-A-

## 2022-11-27 NOTE — TELEPHONE ENCOUNTER
Standing LMWH antiXa level ordered.    Patient should have repeat on Monday.    Jemma Alvarado MD  Internal Medicine & Pediatrics  [facility]  She/her

## 2022-11-28 NOTE — PROGRESS NOTES
Assessment & Plan       ICD-10-CM    1. History of stroke  Z86.73       2. Other pulmonary embolism without acute cor pulmonale, unspecified chronicity (H)  I26.99 Low Molecular Weight Heparin Anti Xa Level        Hx of stroke - several small areas affected.  Occurred while anticoagulated with apixiban, now changed to enoxaparin for ongoing anticoagulation.  Check Anti Xa level today.  Continue f/u with oncology.  No medication changes made today.     mR Richey MD  [facility] ANNE-MARIE Alvarez is a 66 year old accompanied by her spouse, presenting for the following health issues:  No chief complaint on file.      [facility]       Hospital Follow-up Visit:    Hospital/Nursing Home/IP Rehab Facility: [facility]   Date of Admission: 10/29/2022  Date of Discharge: 10/31/2022  Reason(s) for Admission: Aphasia Expressive    Stroke (HCC)    Dysphagia    Was your hospitalization related to COVID-19? No   Problems taking medications regularly:  None  Medication changes since discharge: Yes  Problems adhering to non-medication therapy:  None    Summary of hospitalization:  [facility] discharge summary reviewed  Diagnostic Tests/Treatments reviewed.  Follow up needed: none  Other Healthcare Providers Involved in Patient s Care:         Specialist appointment - oncology, scheduled  Update since discharge: improved.     Plan of care communicated with patient    Hospitalized d/t neurologic symptoms.  Imaging showed several small areas of infarct, scattered.  No new symptoms since hospital discharge.    She had been anticoagulated with Apixaban prior to hospitalization, now changed to enoxaparin.  Lab monitoring is ongoing by our clinic, but she also follows with [facility] oncology.     No acute concerns noted today.          Objective    /60 (BP Location: Right arm, Patient Position: Sitting, Cuff Size: Adult Regular)   Pulse 99   Temp 97.1  F (36.2  C)  "(Temporal)   Ht 1.6 m (5' 3\")   Wt 55.8 kg (123 lb)   SpO2 96%   BMI 21.79 kg/m    Body mass index is 21.79 kg/m .  Physical Exam   GEN: No distress  SKIN: No rashes  HEENT: PERRL. EOMI.   NECK: Supple  LUNGS: Clear to auscultation bilaterally. No rhonchi, rales, wheezes or retractions.  CV: Regular rate and rhythm.  No murmurs, rubs or gallops. Pulses 2+ radial.  NEURO: No focal deficits noted              "

## 2022-11-28 NOTE — TELEPHONE ENCOUNTER
Patient called back.  States that she will take the Lovenox at 0900 today.  This will allow for her to get the lab work checked at the appointment this afternoon.  Per the lab guide-The specimen should be collected 4-6 hours after administration of medication.  No further questions.  Will call back if any other questions or concerns.  STEPHANIE Molina RN

## 2022-11-28 NOTE — PROGRESS NOTES
{PROVIDER CHARTING PREFERENCE:898350}    Subjective   Kim is a 66 year old{ACCOMPANIED BY STATEMENT (Optional):846849}, presenting for the following health issues:  No chief complaint on file.      HPI     ED/UC Followup:    Facility:  [facility]   Date of visit: 10/29/2022        Reason for visit: Aphasia Expressive , Stroke (HCC)     & Dysphagia     Current Status: ***    {additonal problems for provider to add (Optional):091428}    Review of Systems   {ROS COMP (Optional):937977}      Objective    There were no vitals taken for this visit.  There is no height or weight on file to calculate BMI.  Physical Exam   {Exam List (Optional):482474}    {Diagnostic Test Results (Optional):014236}    {AMBULATORY ATTESTATION (Optional):350978}

## 2022-11-28 NOTE — TELEPHONE ENCOUNTER
LMTCB.  Patient has an appointment with Dr. Richey at 1330 today with arrival at 1315.   Want to verify that this will work for the lab draw.  STEPHANIE Molina RN

## 2022-11-29 PROBLEM — I63.10 CEREBROVASCULAR ACCIDENT (CVA) DUE TO EMBOLISM OF PRECEREBRAL ARTERY (H): Status: ACTIVE | Noted: 2022-01-01

## 2022-11-29 NOTE — TELEPHONE ENCOUNTER
Dr. Cogan,    Kim Sparrow, is newly referred to [facility] Anticoagulation.      Indication(s): Stroke and PE   Goal Range: LMWH Anti-Xa 0.5-1.0  Duration: Indefinite     Anticoagulation clinic requires a referral from one of [facility] ambulatory provider (PCP or specialist) in order to provide anticoagulation management. The referring provider should anticipate seeing the patient on an ongoing basis, will have INRs and warfarin Rx ordered under their name per protocol, and will be point of contact for [facility] questions on patient's anticoagulation care (procedural holds, critical results, etc).     Please review and sign the pended referral order for Kim Sparrow if you are willing to oversee anticoagulation care.       Thank you,  CHELSEA FLORES RN  Anticoagulation clinic

## 2022-11-29 NOTE — TELEPHONE ENCOUNTER
"ANTICOAGULATION  MANAGEMENT: NEW REFERRAL      SUBJECTIVE/OBJECTIVE     Kim Sparrow, a 66 year old female  is newly referred to [facility].    Per Hematology E-consult:  \"Recommended lovenox dosing for patient with stage IV lung adenocarcinoma and history of pulmonary embolism and was on DOAC, then had multiple embolic strokes and hospitalized at [place] and switched to lovenox. Was told to follow up with primary care provider for management and antiXa monitoring. [place] oncology office refused to manage.\"      Anticoagulation:    Lovenox initiation date (approximate): 10/31/22   Indication(s): Stroke and PE   Goal Range: LMWH Anti-Xa 0.5-1.0  Referring provider: from PCP-ambulatory responsible provider from primary or specialty care needed.  Request sent to Dr. Cogan to oversee management.    General Dietary/Social Hx:       In the past 2 weeks, patient estimates taking medications as instructed % of time: 100%    Results:        Recent labs: (last 7 days)     11/28/22  1354   ALMWH 0.85       Wt Readings from Last 2 Encounters:   11/28/22 55.8 kg (123 lb)   10/17/22 58.3 kg (128 lb 9.6 oz)      Estimated body mass index is 21.79 kg/m  as calculated from the following:    Height as of 11/28/22: 1.6 m (5' 3\").    Weight as of 11/28/22: 55.8 kg (123 lb).  Lab Results   Component Value Date    AST 22 06/30/2022     Lab Results   Component Value Date    CR 1.17 (H) 11/23/2022     Estimated Creatinine Clearance: 41.7 mL/min (A) (based on SCr of 1.17 mg/dL (H)).    ASSESSMENT     Reports that she is currently taking Lovenox 40 mg once daily in the AM.   Needs a refill ASAP-refill sent to preferred pharmacy. 60 mg syringe ordered to allow for dose adjustments in the future if needed.    PLAN     Dosing Instructions: Continue current Lovenox dose-40 mg daily  with AntiXa in 1 week           Education provided:     Goal range and lab monitoring: goal range and significance of current result, " Importance of therapeutic range and Importance of following up at instructed interval    Symptom monitoring: monitoring for bleeding signs and symptoms and monitoring for clotting signs and symptoms    Lovenox/Heparin education provided: role of enoxaparin/heparin in setting of new PE and/or DVT, prescribed dose and frequency, recommended injection site and site rotation, adjusting syringe/pushing out medication to obtain prescribed dose, proper technique for administration of subcutaneous injection, disposal of syringes, monitoring for signs and symptoms of bruising and bleeding and monitoring for signs and symptoms of clotting     Contact [phone] with any changes, questions or concerns.     Education still needed:     None required      Telephone call with Kim who verbalizes understanding and agrees to plan and who agrees to plan and repeated back plan correctly    Lab visit scheduled    Standing orders placed in [facility]: Point of Care INR (Lab 5000) and LMWH Anti-Xa ([id number])    Plan made with [facility] Pharmacist Carolin FLORES, RN  Anticoagulation Clinic  11/29/2022

## 2022-11-29 NOTE — TELEPHONE ENCOUNTER
JAYLEN to inform patient that the anticoagulation team will be contacting her with information regarding dosing of Lovenox.  STEPHANIE Molina RN

## 2022-11-29 NOTE — TELEPHONE ENCOUNTER
Discussed with Dr. Alvarado.   The anticoagulation team will manage this lab and dosing of Lovenox.  Per Dr. Avlarado-DO NOT call patient with recommendation below, as she will be directed by the anticoagulation team.  STEPHANIE Molina RN

## 2022-11-29 NOTE — TELEPHONE ENCOUNTER
Patient has been contacted by anticoagulation clinic.  Please see their note for details regarding dosing of Lovenox.  STEPHANIE Molina RN

## 2022-11-29 NOTE — TELEPHONE ENCOUNTER
AntiXa level yesterday 0.85.  Anticoagulation clinic referral placed.    Should recheck antiXa Wednesday or Thursday 4-6 hours after morning dose.  Standing order placed.        Jemma Alvarado MD  Internal Medicine & Pediatrics  [facility]  She/her     This report to the nearest emergency room for further evaluation      Patient Education     Abdominal Pain  Abdominal pain is pain in the stomach or belly area. Everyone has this pain from time to time. In many cases it goes away on its own. But abdominal pain can sometimes be due to a serious problem, such as appendicitis. So it’s important to know when to get help.     Causes of abdominal pain   There are many possible causes of abdominal pain. Common causes in adults include:  · Constipation, diarrhea, or gas  · Stomach acid flowing back up into the esophagus (acid reflux or heartburn)  · Severe acid reflux, called GERD (gastroesophageal reflux disease)  · A sore in the lining of the stomach or small intestine (peptic ulcer)  · Inflammation of the gallbladder, liver, or pancreas  · Gallstones or kidney stones  · Appendicitis   · Intestinal blockage   · An internal organ pushing through a muscle or other tissue (hernia)  · Urinary tract infections  · In women, menstrual cramps, fibroids, ovarian cysts, pelvic inflammatory disease, or endometriosis  · Inflammation or infection of the intestines, including Crohn's disease and ulcerative colitis  · Irritable bowel syndrome  Diagnosing the cause of abdominal pain   Your healthcare provider will give you a physical exam help find the cause of your pain. If needed, you will have tests. Belly pain has many possible causes. So it can be hard to find the reason for your pain. Giving details about your pain can help. Tell your provider where and when you feel the pain, and what makes it better or worse. Also let your provider know if you have other symptoms such as:   · Fever  · Tiredness  · Upset stomach (nausea)  · Vomiting  · Changes in bathroom habits  · Blood in the stool or black, tarry stool  · Weight loss that you can't explain (involuntary weight loss?)  Also report any family history of stomach or intestinal problems, or cancers. Tell your provider about all your  alcohol use and drug use. Tell your provider about all medicines you use, including herbs, vitamins, and supplements.   Treating abdominal pain   Some causes of pain need emergency medical treatment right away. These include appendicitis or a bowel blockage. Other problems can be treated with rest, fluids, or medicines. Your healthcare provider can give you specific instructions for treatment or self-care based on what is causing your pain.      If you have vomiting or diarrhea, sip water or other clear fluids. When you are ready to eat solid foods again, start with small amounts of easy-to-digest, low-fat foods. These include apple sauce, toast, or crackers.   When to get medical care   Call 911 or go to the hospital right away if you:   · Can’t pass stool and are vomiting  · Are vomiting blood or have bloody diarrhea or black, tarry diarrhea  · Have chest, neck, or shoulder pain  · Feel like you might pass out  · Have pain in your shoulder blades with nausea  · Have sudden, severe belly pain  · Have new, severe pain unlike any you have felt before  · Have a belly that is rigid, hard, and hurts to touch  Call your healthcare provider if you have:   · Pain for more than 5 days  · Bloating for more than 2 days  · Diarrhea for more than 5 days  · A fever of 100.4°F (38°C) or higher, or as directed by your healthcare provider  · Pain that gets worse  · Weight loss for no reason  · Continued lack of appetite  · Blood in your stool  How to prevent abdominal pain   Here are some tips to help prevent abdominal pain:   · Eat smaller amounts of food at each meal.  · Don't eat greasy, fried, or other high-fat foods.  · Don't eat foods that give you gas.  · Exercise regularly.  · Drink plenty of fluids.  To help prevent GERD symptoms:   · Quit smoking.  · Reduce alcohol and foods that increase stomach acid.  · Don't use aspirin or over-the-counter pain and fever medicines, if possible. This includes nonsteroidal  anti-inflammatory drugs (NSAIDs).  · Lose excess weight.  · Finish eating at least 2 hours before you go to bed or lie down.  · Raise the head of your bed.  [employer] last reviewed this educational content on 4/1/2019 © 2000-2020 [employer]. [address] [place] [id number]. All rights reserved. This information is not intended as a substitute for professional medical care. Always follow your healthcare professional's instructions.

## 2022-12-02 NOTE — PROGRESS NOTES
[facility]  [address] [place] [address]  Phone: [phone], Fax: [phone]    Outpatient Visit Note:    Patient: Kim Sparrow  MRN: [id number]  : 1955  ERIK: Dec 6, 2022    Reason for Consultation:  Kim Sparrow is referred for evaluation and treatment of recurrent blood clots.    Assessment:  Kim Sparrow is a 66 year old woman with metastatic lung adenocarcinoma and recurrent blood clots, most recently symptomatic strokes despite therapeutic apixaban.  It seems most likely that this is hypercoagulability related to her underlying malignancy.  She is doing well on Lovenox currently, and while I would like to spare her injections, I do not see a better option at this point.  I would not resume DOAC therapy given that she had strokes while on apixaban.  Coumadin may be an option, but I am reluctant to make the change now given the stability on Lovenox currently, and the challenges of remaining therapeutic on warfarin.  We can touch base in 2 months after she sees her oncologist and her disease status is assessed, and we can revisit the issue of whether to continue or change from Lovenox at that time.    Plan:  -Continue Lovenox, dosing per anticoagulation clinic  -Follow-up in 2 months with video visit    The patient is given our center's contact information and is instructed to call if she should have any further questions or concerns.    Inessa Alcazar, nurse clinician, is assigned to provide patient care coordination and education.     Patient understands and agrees with the above plan and recommendation.    Jacob Cogan, MD  Hematology    30 minutes spent on the date of the encounter doing chart review, history and exam, documentation and further activities per the note    ----------------------------------------------------------------------------------------------------------------------  History of Present Illness:  Kim Ocampoyce is a 66 year old  woman with a history of metastatic lung adenocarcinoma (s/p XRT and multiple chemotherapies C/B bony mets, on mobocertinib), pulmonary embolism (judged to be unprovoked, 2020, was on apixaban), CKD, recent strokes now on Lovenox monitored by anti-Xa levels, referred to hematology for management of anticoagulation.    She had a brain MRI on October 21 to screen for brain mets.  MRI showed several acute/subacute infarcts in multiple vascular territories most prominent in the occipital lobe.  Then on October 22 she developed vision difficulties on the left which improved gradually.  Then on October 29 she had word finding difficulties and went to [facility].  Found to have new frontal and parietal infarcts.  CTA showed occlusion of the left MCA.  Not a candidate for thrombolytics or endovascular therapy.  Overall concerning for proximal embolic source.  IRVING not obtained due to esophageal stricture.  TTE with no evidence of thrombus.  Apixaban changed to Lovenox.    She is currently on Lovenox once daily, managed by the anticoagulation clinic.  Her anti-Xa levels have been high, leading to the once daily dosage currently.  She dislikes the shots but overall feels better.  Has occasional nosebleeds when blowing her nose in the morning, but otherwise no bleeding or bruising.    Past Medical History:  Past Medical History:   Diagnosis Date     Diverticulitis 8/6/2014     Diverticulitis of colon 9/7/2012     Diverticulosis 12/1/08    mild, diagnosed on screening colonoscopy     Other and unspecified hyperlipidemia        Past Surgical History:  Past Surgical History:   Procedure Laterality Date     COLONOSCOPY  10/28/2014    Dr. Juarez [facility]     LAPAROSCOPIC ASSISTED COLECTOMY LEFT (DESCENDING) N/A 10/29/2014    Procedure: LAPAROSCOPIC ASSISTED COLECTOMY LEFT (DESCENDING);  Surgeon: Kim Juarez MD;  Location: [facility] OR     TUBAL LIGATION  1995     wisdom teeth extraction         Medications:  Current Outpatient Medications    Medication Sig Dispense Refill     acetaminophen (TYLENOL) 500 MG tablet Take 500-1,000 mg by mouth every 6 hours as needed       atorvastatin (LIPITOR) 40 MG tablet Take 40 mg by mouth daily       diphenhydrAMINE-acetaminophen (TYLENOL PM)  MG tablet Take 1 tablet by mouth nightly as needed       enoxaparin ANTICOAGULANT (LOVENOX) 60 MG/0.6ML syringe Inject 0.4 mLs (40 mg) Subcutaneous every 24 hours 14.4 mL 3     EXKIVITY 40 MG capsule        levothyroxine (SYNTHROID/LEVOTHROID) 112 MCG tablet Take 112 mcg by mouth daily       lisinopril (ZESTRIL) 10 MG tablet Take 10 mg by mouth daily       LORazepam (ATIVAN) 0.5 MG tablet As need for anxiety every 6 hours.  Try one half tab. (Patient not taking: Reported on 11/28/2022)       ondansetron (ZOFRAN) 8 MG tablet Take 4 mg by mouth daily as needed Take daily when on mo       oxyCODONE (ROXICODONE) 5 MG tablet Take 5 mg by mouth every 6 hours as needed       sodium chloride 1 GM tablet Take 1 g by mouth 2 times daily          Allergies:  Allergies   Allergen Reactions     Carboplatin Itching     Itching in hands and feet.         ROS:  Remainder of a comprehensive 14 point ROS is negative unless noted above.    Social History:  Denies any tobacco use. No significant alcohol use. Denies any illicit drug use.     Family History:  Non-contributory to the current presentation    Objectives:  N/A, video visit    Labs:  WBC   Date Value Ref Range Status   08/07/2014 7.1 4.0 - 11.0 10e9/L Final   08/06/2014 11.0 4.0 - 11.0 10e9/L Final   07/07/2014 8.4 4.0 - 11.0 10e9/L Final   09/07/2012 8.3 4.0 - 11.0 10e9/L Final     Hemoglobin   Date Value Ref Range Status   10/28/2014 14.3 11.7 - 15.7 g/dL Final   08/07/2014 12.2 11.7 - 15.7 g/dL Final   08/06/2014 13.2 11.7 - 15.7 g/dL Final   07/07/2014 13.0 11.7 - 15.7 g/dL Final     Hematocrit   Date Value Ref Range Status   08/07/2014 35.8 35.0 - 47.0 % Final   08/06/2014 39.5 35.0 - 47.0 % Final   07/07/2014 37.9 35.0 -  47.0 % Final   09/07/2012 40.1 35.0 - 47.0 % Final     Platelet Count   Date Value Ref Range Status   11/01/2014 206 150 - 450 10e9/L Final   10/30/2014 196 150 - 450 10e9/L Final   08/07/2014 235 150 - 450 10e9/L Final   08/06/2014 265 150 - 450 10e9/L Final         Imaging:  Mammogram - HIM Scan  Mammo 10/15/14   [facility]  [place]

## 2022-12-07 NOTE — PROGRESS NOTES
ANTICOAGULATION MANAGEMENT     Kim Monroee, 66 year old female on Enoxaparin    Current dosing: Lovenox 40 mg every 24 hours  Administration times: 0800  Supplies: 60 mg pre-filled enoxaparin syringe     Goal: LMWH Anti-Xa 0.5-1.0    Recent labs: (last 7 days)     12/06/22  1252   ALMWH 0.69       Lab Results   Component Value Date    CR 1.17 11/23/2022    CR 0.74 10/30/2014       Wt Readings from Last 3 Encounters:   11/28/22 55.8 kg (123 lb)   10/17/22 58.3 kg (128 lb 9.6 oz)   06/08/22 60.3 kg (133 lb)     Per Hematology virtual visit   Plan:  -Continue Lovenox, dosing per anticoagulation clinic  -Follow-up in 2 months with video visit    ASSESSMENT     Lab draw done 4 to 6 hours after last injection: Yes, confirmed with Kim today    Missed doses in last 72 hours: No    Signs or symptoms of bleeding or clotting: No    PLAN     Dosing instructions: Continue current dose: 40 mg every 24 hours     Next recommended lab: 1 week  Lab visit scheduled    Critical priority set: Yes    MyChart message with Kim who verbalizes understanding and agrees to plan and who agrees to plan and repeated back plan correctly    Plan made per [facility] anticoagulation protocol    CHELSEA FLORES RN  Anticoagulation Clinic   [phone]

## 2022-12-14 NOTE — PROGRESS NOTES
ANTICOAGULATION MANAGEMENT     Kim Sparrow, 66 year old female on Enoxaparin    Current dosinmg every 24 hours  Administration times: 0800  Supplies: 60 mg pre-filled enoxaparin syringe     Goal: LMWH Anti-Xa 0.5-1.0    Recent labs: (last 7 days)     22  1256   ALMWH 0.71       Lab Results   Component Value Date    CR 1.17 2022    CR 0.74 10/30/2014       Wt Readings from Last 3 Encounters:   22 55.8 kg (123 lb)   10/17/22 58.3 kg (128 lb 9.6 oz)   22 60.3 kg (133 lb)       ASSESSMENT     Lab draw done 4 to 6 hours after last injection: Yes, per chart review    Missed doses in last 72 hours: No    Signs or symptoms of bleeding or clotting: No    PLAN     Dosing instructions: Continue current dose: 40mg every 24 hours     Next recommended lab: 2 weeks  Contact [phone] to schedule and with any changes, questions or concerns.     Critical priority set: Yes    Detailed voice message left for Kim with dosing instructions and follow up date.     Plan made per [facility] anticoagulation protocol    Branden Gandhi, RN  Anticoagulation Clinic   [phone]

## 2022-12-29 NOTE — PROGRESS NOTES
ANTICOAGULATION MANAGEMENT     Kim Monroee, 67 year old female on Enoxaparin    Current dosin mg every 24 hours  Administration times: 8:00 AM  Supplies: 60 mg pre-filled enoxaparin syringe     Goal: LMWH Anti-Xa 0.5-1.0    Recent labs: (last 7 days)     22  1232   ALMWH 0.77       Lab Results   Component Value Date    CR 1.17 2022    CR 0.74 10/30/2014       Wt Readings from Last 3 Encounters:   22 55.8 kg (123 lb)   10/17/22 58.3 kg (128 lb 9.6 oz)   22 60.3 kg (133 lb)       ASSESSMENT     Lab draw done 4 to 6 hours after last injection: Yes, confirmed with Kim today    Missed doses in last 72 hours: No    Signs or symptoms of bleeding or clotting: slight blood from nose first thing in the AM occasionally.  Recommended humidifier    PLAN     Dosing instructions: Continue current dose: 40 mg every 24 hours     Next recommended lab: 2 weeks  Lab visit scheduled    Critical priority set: Yes    Telephone call with Kim who verbalizes understanding and agrees to plan    Plan made per [facility] anticoagulation protocol    Mira Banks, RN  Anticoagulation Clinic   [phone]

## 2023-01-01 ENCOUNTER — LAB (OUTPATIENT)
Dept: LAB | Facility: CLINIC | Age: 68
End: 2023-01-01
Payer: MEDICARE

## 2023-01-01 ENCOUNTER — ANTICOAGULATION THERAPY VISIT (OUTPATIENT)
Dept: ANTICOAGULATION | Facility: CLINIC | Age: 68
End: 2023-01-01

## 2023-01-01 ENCOUNTER — TELEPHONE (OUTPATIENT)
Dept: HEMATOLOGY | Facility: CLINIC | Age: 68
End: 2023-01-01
Payer: MEDICARE

## 2023-01-01 ENCOUNTER — MYC MEDICAL ADVICE (OUTPATIENT)
Dept: PEDIATRICS | Facility: CLINIC | Age: 68
End: 2023-01-01
Payer: MEDICARE

## 2023-01-01 ENCOUNTER — VIRTUAL VISIT (OUTPATIENT)
Dept: HEMATOLOGY | Facility: CLINIC | Age: 68
End: 2023-01-01
Attending: STUDENT IN AN ORGANIZED HEALTH CARE EDUCATION/TRAINING PROGRAM
Payer: MEDICARE

## 2023-01-01 ENCOUNTER — DOCUMENTATION ONLY (OUTPATIENT)
Dept: HEMATOLOGY | Facility: CLINIC | Age: 68
End: 2023-01-01
Payer: MEDICARE

## 2023-01-01 ENCOUNTER — INFUSION THERAPY VISIT (OUTPATIENT)
Dept: INFUSION THERAPY | Facility: CLINIC | Age: 68
End: 2023-01-01
Attending: STUDENT IN AN ORGANIZED HEALTH CARE EDUCATION/TRAINING PROGRAM
Payer: MEDICARE

## 2023-01-01 ENCOUNTER — TELEPHONE (OUTPATIENT)
Dept: PEDIATRICS | Facility: CLINIC | Age: 68
End: 2023-01-01
Payer: MEDICARE

## 2023-01-01 ENCOUNTER — TELEPHONE (OUTPATIENT)
Dept: ONCOLOGY | Facility: CLINIC | Age: 68
End: 2023-01-01
Payer: MEDICARE

## 2023-01-01 ENCOUNTER — MYC REFILL (OUTPATIENT)
Dept: PEDIATRICS | Facility: CLINIC | Age: 68
End: 2023-01-01
Payer: MEDICARE

## 2023-01-01 ENCOUNTER — TELEPHONE (OUTPATIENT)
Dept: HEMATOLOGY | Facility: CLINIC | Age: 68
End: 2023-01-01

## 2023-01-01 ENCOUNTER — ANTICOAGULATION THERAPY VISIT (OUTPATIENT)
Dept: ANTICOAGULATION | Facility: CLINIC | Age: 68
End: 2023-01-01
Payer: MEDICARE

## 2023-01-01 ENCOUNTER — MYC MEDICAL ADVICE (OUTPATIENT)
Dept: HEMATOLOGY | Facility: CLINIC | Age: 68
End: 2023-01-01
Payer: MEDICARE

## 2023-01-01 ENCOUNTER — TRANSFERRED RECORDS (OUTPATIENT)
Dept: HEALTH INFORMATION MANAGEMENT | Facility: CLINIC | Age: 68
End: 2023-01-01
Payer: MEDICARE

## 2023-01-01 ENCOUNTER — MYC MEDICAL ADVICE (OUTPATIENT)
Dept: PEDIATRICS | Facility: CLINIC | Age: 68
End: 2023-01-01

## 2023-01-01 ENCOUNTER — HEALTH MAINTENANCE LETTER (OUTPATIENT)
Age: 68
End: 2023-01-01

## 2023-01-01 ENCOUNTER — VIRTUAL VISIT (OUTPATIENT)
Dept: PHARMACY | Facility: CLINIC | Age: 68
End: 2023-01-01
Attending: STUDENT IN AN ORGANIZED HEALTH CARE EDUCATION/TRAINING PROGRAM
Payer: COMMERCIAL

## 2023-01-01 ENCOUNTER — TELEPHONE (OUTPATIENT)
Dept: ANTICOAGULATION | Facility: CLINIC | Age: 68
End: 2023-01-01
Payer: MEDICARE

## 2023-01-01 ENCOUNTER — OFFICE VISIT (OUTPATIENT)
Dept: PEDIATRICS | Facility: CLINIC | Age: 68
End: 2023-01-01
Payer: MEDICARE

## 2023-01-01 ENCOUNTER — NURSE TRIAGE (OUTPATIENT)
Dept: NURSING | Facility: CLINIC | Age: 68
End: 2023-01-01
Payer: MEDICARE

## 2023-01-01 ENCOUNTER — VIRTUAL VISIT (OUTPATIENT)
Dept: PEDIATRICS | Facility: CLINIC | Age: 68
End: 2023-01-01
Payer: MEDICARE

## 2023-01-01 ENCOUNTER — OFFICE VISIT (OUTPATIENT)
Dept: URGENT CARE | Facility: URGENT CARE | Age: 68
End: 2023-01-01
Payer: MEDICARE

## 2023-01-01 ENCOUNTER — E-VISIT (OUTPATIENT)
Dept: PEDIATRICS | Facility: CLINIC | Age: 68
End: 2023-01-01
Payer: MEDICARE

## 2023-01-01 VITALS
TEMPERATURE: 97.6 F | OXYGEN SATURATION: 95 % | RESPIRATION RATE: 16 BRPM | HEIGHT: 63 IN | DIASTOLIC BLOOD PRESSURE: 80 MMHG | SYSTOLIC BLOOD PRESSURE: 130 MMHG | HEART RATE: 99 BPM | WEIGHT: 117.6 LBS | BODY MASS INDEX: 20.84 KG/M2

## 2023-01-01 VITALS
HEART RATE: 80 BPM | TEMPERATURE: 97.7 F | RESPIRATION RATE: 16 BRPM | OXYGEN SATURATION: 98 % | SYSTOLIC BLOOD PRESSURE: 144 MMHG | DIASTOLIC BLOOD PRESSURE: 84 MMHG

## 2023-01-01 VITALS
RESPIRATION RATE: 16 BRPM | OXYGEN SATURATION: 97 % | TEMPERATURE: 97.4 F | DIASTOLIC BLOOD PRESSURE: 78 MMHG | SYSTOLIC BLOOD PRESSURE: 140 MMHG | HEART RATE: 101 BPM

## 2023-01-01 DIAGNOSIS — E22.2 SYNDROME OF INAPPROPRIATE SECRETION OF ANTIDIURETIC HORMONE (H): ICD-10-CM

## 2023-01-01 DIAGNOSIS — E03.9 PRIMARY HYPOTHYROIDISM: ICD-10-CM

## 2023-01-01 DIAGNOSIS — Z12.31 VISIT FOR SCREENING MAMMOGRAM: ICD-10-CM

## 2023-01-01 DIAGNOSIS — I63.10 CEREBROVASCULAR ACCIDENT (CVA) DUE TO EMBOLISM OF PRECEREBRAL ARTERY (H): ICD-10-CM

## 2023-01-01 DIAGNOSIS — Z00.00 ENCOUNTER FOR MEDICARE ANNUAL WELLNESS EXAM: Primary | ICD-10-CM

## 2023-01-01 DIAGNOSIS — I12.9 HYPERTENSIVE KIDNEY DISEASE, STAGE III (H): ICD-10-CM

## 2023-01-01 DIAGNOSIS — Z13.820 SCREENING FOR OSTEOPOROSIS: Primary | ICD-10-CM

## 2023-01-01 DIAGNOSIS — I26.99 OTHER PULMONARY EMBOLISM WITHOUT ACUTE COR PULMONALE, UNSPECIFIED CHRONICITY (H): Primary | ICD-10-CM

## 2023-01-01 DIAGNOSIS — N18.30 HYPERTENSIVE KIDNEY DISEASE, STAGE III (H): ICD-10-CM

## 2023-01-01 DIAGNOSIS — E78.00 PURE HYPERCHOLESTEROLEMIA: ICD-10-CM

## 2023-01-01 DIAGNOSIS — D64.9 ANEMIA, UNSPECIFIED TYPE: Primary | ICD-10-CM

## 2023-01-01 DIAGNOSIS — Z12.11 ENCOUNTER FOR SCREENING FOR MALIGNANT NEOPLASM OF COLON: ICD-10-CM

## 2023-01-01 DIAGNOSIS — E78.5 HYPERLIPIDEMIA LDL GOAL <130: ICD-10-CM

## 2023-01-01 DIAGNOSIS — I26.99 OTHER PULMONARY EMBOLISM WITHOUT ACUTE COR PULMONALE, UNSPECIFIED CHRONICITY (H): ICD-10-CM

## 2023-01-01 DIAGNOSIS — Z86.711 HISTORY OF PULMONARY EMBOLISM: ICD-10-CM

## 2023-01-01 DIAGNOSIS — C34.91 ADENOCARCINOMA OF RIGHT LUNG (H): Primary | ICD-10-CM

## 2023-01-01 DIAGNOSIS — M54.9 CHRONIC LEFT-SIDED BACK PAIN, UNSPECIFIED BACK LOCATION: Primary | ICD-10-CM

## 2023-01-01 DIAGNOSIS — D50.0 IRON DEFICIENCY ANEMIA DUE TO CHRONIC BLOOD LOSS: Primary | ICD-10-CM

## 2023-01-01 DIAGNOSIS — G89.29 OTHER CHRONIC PAIN: ICD-10-CM

## 2023-01-01 DIAGNOSIS — Z12.11 SCREEN FOR COLON CANCER: ICD-10-CM

## 2023-01-01 DIAGNOSIS — I63.10 CEREBROVASCULAR ACCIDENT (CVA) DUE TO EMBOLISM OF PRECEREBRAL ARTERY (H): Primary | ICD-10-CM

## 2023-01-01 DIAGNOSIS — C34.91 ADENOCARCINOMA OF RIGHT LUNG (H): ICD-10-CM

## 2023-01-01 DIAGNOSIS — E87.1 HYPONATREMIA: ICD-10-CM

## 2023-01-01 DIAGNOSIS — Z78.9 TAKES DIETARY SUPPLEMENTS: ICD-10-CM

## 2023-01-01 DIAGNOSIS — G89.29 CHRONIC LEFT-SIDED THORACIC BACK PAIN: ICD-10-CM

## 2023-01-01 DIAGNOSIS — G89.29 CHRONIC LEFT-SIDED BACK PAIN, UNSPECIFIED BACK LOCATION: ICD-10-CM

## 2023-01-01 DIAGNOSIS — T14.8XXA SKIN ABRASION: Primary | ICD-10-CM

## 2023-01-01 DIAGNOSIS — R19.7 DIARRHEA, UNSPECIFIED TYPE: Primary | ICD-10-CM

## 2023-01-01 DIAGNOSIS — C34.90 MALIGNANT NEOPLASM OF LUNG (H): Primary | ICD-10-CM

## 2023-01-01 DIAGNOSIS — Z00.00 PREVENTATIVE HEALTH CARE: ICD-10-CM

## 2023-01-01 DIAGNOSIS — R13.10 DYSPHAGIA, UNSPECIFIED TYPE: Primary | ICD-10-CM

## 2023-01-01 DIAGNOSIS — M54.6 CHRONIC LEFT-SIDED THORACIC BACK PAIN: ICD-10-CM

## 2023-01-01 DIAGNOSIS — R19.7 DIARRHEA, UNSPECIFIED TYPE: ICD-10-CM

## 2023-01-01 DIAGNOSIS — G89.29 CHRONIC LEFT-SIDED BACK PAIN, UNSPECIFIED BACK LOCATION: Primary | ICD-10-CM

## 2023-01-01 DIAGNOSIS — T40.2X5A THERAPEUTIC OPIOID INDUCED CONSTIPATION: Primary | ICD-10-CM

## 2023-01-01 DIAGNOSIS — R12 HEARTBURN: ICD-10-CM

## 2023-01-01 DIAGNOSIS — I12.9 HYPERTENSIVE KIDNEY DISEASE, STAGE III (H): Primary | ICD-10-CM

## 2023-01-01 DIAGNOSIS — E03.9 PRIMARY HYPOTHYROIDISM: Primary | ICD-10-CM

## 2023-01-01 DIAGNOSIS — M54.9 CHRONIC LEFT-SIDED BACK PAIN, UNSPECIFIED BACK LOCATION: ICD-10-CM

## 2023-01-01 DIAGNOSIS — E87.1 HYPONATREMIA SYNDROME: Primary | ICD-10-CM

## 2023-01-01 DIAGNOSIS — D64.9 ANEMIA, UNSPECIFIED TYPE: ICD-10-CM

## 2023-01-01 DIAGNOSIS — D50.0 IRON DEFICIENCY ANEMIA DUE TO CHRONIC BLOOD LOSS: ICD-10-CM

## 2023-01-01 DIAGNOSIS — N18.30 HYPERTENSIVE KIDNEY DISEASE, STAGE III (H): Primary | ICD-10-CM

## 2023-01-01 DIAGNOSIS — C34.31: ICD-10-CM

## 2023-01-01 DIAGNOSIS — K59.03 THERAPEUTIC OPIOID INDUCED CONSTIPATION: Primary | ICD-10-CM

## 2023-01-01 DIAGNOSIS — R79.1 ABNORMAL COAGULATION PROFILE: ICD-10-CM

## 2023-01-01 DIAGNOSIS — R92.1 CALCIFICATION OF LEFT BREAST: ICD-10-CM

## 2023-01-01 LAB
ALBUMIN SERPL BCG-MCNC: 3.8 G/DL (ref 3.5–5.2)
ALP SERPL-CCNC: 101 U/L (ref 35–104)
ALT SERPL W P-5'-P-CCNC: 14 U/L (ref 0–50)
ANION GAP SERPL CALCULATED.3IONS-SCNC: 11 MMOL/L (ref 7–15)
ANION GAP SERPL CALCULATED.3IONS-SCNC: 13 MMOL/L (ref 7–15)
AST SERPL W P-5'-P-CCNC: 22 U/L (ref 0–45)
BASOPHILS # BLD AUTO: 0 10E3/UL (ref 0–0.2)
BASOPHILS # BLD AUTO: 0 10E3/UL (ref 0–0.2)
BASOPHILS NFR BLD AUTO: 0 %
BASOPHILS NFR BLD AUTO: 0 %
BILIRUB SERPL-MCNC: 0.3 MG/DL
BUN SERPL-MCNC: 20.6 MG/DL (ref 8–23)
BUN SERPL-MCNC: 21.3 MG/DL (ref 8–23)
CALCIUM SERPL-MCNC: 9.6 MG/DL (ref 8.8–10.2)
CALCIUM SERPL-MCNC: 9.8 MG/DL (ref 8.8–10.2)
CHLORIDE SERPL-SCNC: 101 MMOL/L (ref 98–107)
CHLORIDE SERPL-SCNC: 88 MMOL/L (ref 98–107)
CHOLEST SERPL-MCNC: 155 MG/DL
CREAT SERPL-MCNC: 1.24 MG/DL (ref 0.51–0.95)
CREAT SERPL-MCNC: 1.4 MG/DL (ref 0.51–0.95)
DEPRECATED HCO3 PLAS-SCNC: 23 MMOL/L (ref 22–29)
DEPRECATED HCO3 PLAS-SCNC: 25 MMOL/L (ref 22–29)
EOSINOPHIL # BLD AUTO: 0.1 10E3/UL (ref 0–0.7)
EOSINOPHIL # BLD AUTO: 0.1 10E3/UL (ref 0–0.7)
EOSINOPHIL NFR BLD AUTO: 1 %
EOSINOPHIL NFR BLD AUTO: 2 %
EPO SERPL-ACNC: 11 MU/ML
ERYTHROCYTE [DISTWIDTH] IN BLOOD BY AUTOMATED COUNT: 11.7 % (ref 10–15)
ERYTHROCYTE [DISTWIDTH] IN BLOOD BY AUTOMATED COUNT: 11.8 % (ref 10–15)
ERYTHROCYTE [DISTWIDTH] IN BLOOD BY AUTOMATED COUNT: 12.4 % (ref 10–15)
FERRITIN SERPL-MCNC: 219 NG/ML (ref 11–328)
FERRITIN SERPL-MCNC: 277 NG/ML (ref 11–328)
GFR SERPL CREATININE-BSD FRML MDRD: 41 ML/MIN/1.73M2
GFR SERPL CREATININE-BSD FRML MDRD: 47 ML/MIN/1.73M2
GLUCOSE SERPL-MCNC: 116 MG/DL (ref 70–99)
GLUCOSE SERPL-MCNC: 99 MG/DL (ref 70–99)
HCT VFR BLD AUTO: 31.5 % (ref 35–47)
HCT VFR BLD AUTO: 32 % (ref 35–47)
HCT VFR BLD AUTO: 33.2 % (ref 35–47)
HDLC SERPL-MCNC: 58 MG/DL
HGB BLD-MCNC: 10.4 G/DL (ref 11.7–15.7)
HGB BLD-MCNC: 10.5 G/DL (ref 11.7–15.7)
HGB BLD-MCNC: 10.9 G/DL (ref 11.7–15.7)
IMM GRANULOCYTES # BLD: 0 10E3/UL
IMM GRANULOCYTES NFR BLD: 0 %
IRON BINDING CAPACITY (ROCHE): 293 UG/DL (ref 240–430)
IRON BINDING CAPACITY (ROCHE): 303 UG/DL (ref 240–430)
IRON SATN MFR SERPL: 11 % (ref 15–46)
IRON SATN MFR SERPL: 16 % (ref 15–46)
IRON SERPL-MCNC: 32 UG/DL (ref 37–145)
IRON SERPL-MCNC: 48 UG/DL (ref 37–145)
LDLC SERPL CALC-MCNC: 78 MG/DL
LMWH PPP CHRO-ACNC: 0.79 IU/ML
LMWH PPP CHRO-ACNC: 0.91 IU/ML
LYMPHOCYTES # BLD AUTO: 0.3 10E3/UL (ref 0.8–5.3)
LYMPHOCYTES # BLD AUTO: 0.5 10E3/UL (ref 0.8–5.3)
LYMPHOCYTES NFR BLD AUTO: 5 %
LYMPHOCYTES NFR BLD AUTO: 8 %
MCH RBC QN AUTO: 31.5 PG (ref 26.5–33)
MCH RBC QN AUTO: 31.8 PG (ref 26.5–33)
MCH RBC QN AUTO: 32.5 PG (ref 26.5–33)
MCHC RBC AUTO-ENTMCNC: 32.5 G/DL (ref 31.5–36.5)
MCHC RBC AUTO-ENTMCNC: 32.8 G/DL (ref 31.5–36.5)
MCHC RBC AUTO-ENTMCNC: 33.3 G/DL (ref 31.5–36.5)
MCV RBC AUTO: 96 FL (ref 78–100)
MCV RBC AUTO: 98 FL (ref 78–100)
MCV RBC AUTO: 98 FL (ref 78–100)
MONOCYTES # BLD AUTO: 0.4 10E3/UL (ref 0–1.3)
MONOCYTES # BLD AUTO: 0.5 10E3/UL (ref 0–1.3)
MONOCYTES NFR BLD AUTO: 7 %
MONOCYTES NFR BLD AUTO: 7 %
NEUTROPHILS # BLD AUTO: 5.1 10E3/UL (ref 1.6–8.3)
NEUTROPHILS # BLD AUTO: 5.7 10E3/UL (ref 1.6–8.3)
NEUTROPHILS NFR BLD AUTO: 83 %
NEUTROPHILS NFR BLD AUTO: 87 %
NONHDLC SERPL-MCNC: 97 MG/DL
PATH REPORT.COMMENTS IMP SPEC: NORMAL
PATH REPORT.COMMENTS IMP SPEC: NORMAL
PATH REPORT.FINAL DX SPEC: NORMAL
PATH REPORT.MICROSCOPIC SPEC OTHER STN: NORMAL
PATH REPORT.MICROSCOPIC SPEC OTHER STN: NORMAL
PATH REPORT.RELEVANT HX SPEC: NORMAL
PLATELET # BLD AUTO: 290 10E3/UL (ref 150–450)
PLATELET # BLD AUTO: 291 10E3/UL (ref 150–450)
PLATELET # BLD AUTO: 377 10E3/UL (ref 150–450)
POTASSIUM SERPL-SCNC: 4.8 MMOL/L (ref 3.4–5.3)
POTASSIUM SERPL-SCNC: 4.8 MMOL/L (ref 3.4–5.3)
PROT SERPL-MCNC: 6.2 G/DL (ref 6.4–8.3)
RBC # BLD AUTO: 3.23 10E6/UL (ref 3.8–5.2)
RBC # BLD AUTO: 3.27 10E6/UL (ref 3.8–5.2)
RBC # BLD AUTO: 3.46 10E6/UL (ref 3.8–5.2)
RETICS # AUTO: 0.04 10E6/UL (ref 0.03–0.1)
RETICS/RBC NFR AUTO: 1.1 % (ref 0.5–2)
SODIUM SERPL-SCNC: 124 MMOL/L (ref 136–145)
SODIUM SERPL-SCNC: 132 MMOL/L (ref 136–145)
SODIUM SERPL-SCNC: 137 MMOL/L (ref 136–145)
T4 FREE SERPL-MCNC: 1.4 NG/DL (ref 0.9–1.7)
T4 FREE SERPL-MCNC: 1.46 NG/DL (ref 0.9–1.7)
TRIGL SERPL-MCNC: 96 MG/DL
TSH SERPL DL<=0.005 MIU/L-ACNC: 10.7 UIU/ML (ref 0.3–4.2)
TSH SERPL DL<=0.005 MIU/L-ACNC: 6.56 UIU/ML (ref 0.3–4.2)
WBC # BLD AUTO: 6.1 10E3/UL (ref 4–11)
WBC # BLD AUTO: 6.5 10E3/UL (ref 4–11)
WBC # BLD AUTO: 6.6 10E3/UL (ref 4–11)

## 2023-01-01 PROCEDURE — 84443 ASSAY THYROID STIM HORMONE: CPT

## 2023-01-01 PROCEDURE — 85027 COMPLETE CBC AUTOMATED: CPT

## 2023-01-01 PROCEDURE — 36415 COLL VENOUS BLD VENIPUNCTURE: CPT | Performed by: STUDENT IN AN ORGANIZED HEALTH CARE EDUCATION/TRAINING PROGRAM

## 2023-01-01 PROCEDURE — 84439 ASSAY OF FREE THYROXINE: CPT

## 2023-01-01 PROCEDURE — 80048 BASIC METABOLIC PNL TOTAL CA: CPT

## 2023-01-01 PROCEDURE — 36415 COLL VENOUS BLD VENIPUNCTURE: CPT

## 2023-01-01 PROCEDURE — 99207 BLOOD MORPHOLOGY PATHOLOGIST REVIEW: CPT | Performed by: PATHOLOGY

## 2023-01-01 PROCEDURE — 99214 OFFICE O/P EST MOD 30 MIN: CPT | Mod: VID | Performed by: STUDENT IN AN ORGANIZED HEALTH CARE EDUCATION/TRAINING PROGRAM

## 2023-01-01 PROCEDURE — 99207 PR NO CHARGE LOS: CPT

## 2023-01-01 PROCEDURE — 99000 SPECIMEN HANDLING OFFICE-LAB: CPT

## 2023-01-01 PROCEDURE — 80061 LIPID PANEL: CPT

## 2023-01-01 PROCEDURE — 99212 OFFICE O/P EST SF 10 MIN: CPT | Performed by: PHYSICIAN ASSISTANT

## 2023-01-01 PROCEDURE — 99215 OFFICE O/P EST HI 40 MIN: CPT | Mod: 95 | Performed by: STUDENT IN AN ORGANIZED HEALTH CARE EDUCATION/TRAINING PROGRAM

## 2023-01-01 PROCEDURE — G0463 HOSPITAL OUTPT CLINIC VISIT: HCPCS | Mod: PN,GT | Performed by: STUDENT IN AN ORGANIZED HEALTH CARE EDUCATION/TRAINING PROGRAM

## 2023-01-01 PROCEDURE — 99397 PER PM REEVAL EST PAT 65+ YR: CPT | Performed by: STUDENT IN AN ORGANIZED HEALTH CARE EDUCATION/TRAINING PROGRAM

## 2023-01-01 PROCEDURE — 85045 AUTOMATED RETICULOCYTE COUNT: CPT

## 2023-01-01 PROCEDURE — 85025 COMPLETE CBC W/AUTO DIFF WBC: CPT

## 2023-01-01 PROCEDURE — 83550 IRON BINDING TEST: CPT

## 2023-01-01 PROCEDURE — 82728 ASSAY OF FERRITIN: CPT

## 2023-01-01 PROCEDURE — 80053 COMPREHEN METABOLIC PANEL: CPT

## 2023-01-01 PROCEDURE — G0439 PPPS, SUBSEQ VISIT: HCPCS | Performed by: STUDENT IN AN ORGANIZED HEALTH CARE EDUCATION/TRAINING PROGRAM

## 2023-01-01 PROCEDURE — 96366 THER/PROPH/DIAG IV INF ADDON: CPT

## 2023-01-01 PROCEDURE — 83540 ASSAY OF IRON: CPT

## 2023-01-01 PROCEDURE — 99214 OFFICE O/P EST MOD 30 MIN: CPT | Mod: 25 | Performed by: STUDENT IN AN ORGANIZED HEALTH CARE EDUCATION/TRAINING PROGRAM

## 2023-01-01 PROCEDURE — 99213 OFFICE O/P EST LOW 20 MIN: CPT | Mod: 95 | Performed by: STUDENT IN AN ORGANIZED HEALTH CARE EDUCATION/TRAINING PROGRAM

## 2023-01-01 PROCEDURE — 250N000011 HC RX IP 250 OP 636: Performed by: STUDENT IN AN ORGANIZED HEALTH CARE EDUCATION/TRAINING PROGRAM

## 2023-01-01 PROCEDURE — 85520 HEPARIN ASSAY: CPT

## 2023-01-01 PROCEDURE — 258N000003 HC RX IP 258 OP 636: Performed by: STUDENT IN AN ORGANIZED HEALTH CARE EDUCATION/TRAINING PROGRAM

## 2023-01-01 PROCEDURE — 96365 THER/PROPH/DIAG IV INF INIT: CPT

## 2023-01-01 PROCEDURE — 99207 PR NO BILLABLE SERVICE THIS VISIT: CPT | Performed by: STUDENT IN AN ORGANIZED HEALTH CARE EDUCATION/TRAINING PROGRAM

## 2023-01-01 PROCEDURE — 82668 ASSAY OF ERYTHROPOIETIN: CPT | Mod: 90

## 2023-01-01 PROCEDURE — 85520 HEPARIN ASSAY: CPT | Performed by: STUDENT IN AN ORGANIZED HEALTH CARE EDUCATION/TRAINING PROGRAM

## 2023-01-01 PROCEDURE — 84295 ASSAY OF SERUM SODIUM: CPT

## 2023-01-01 RX ORDER — OXYCODONE HYDROCHLORIDE 5 MG/1
5 TABLET ORAL EVERY 6 HOURS PRN
Qty: 90 TABLET | Refills: 0 | Status: SHIPPED | OUTPATIENT
Start: 2023-01-01

## 2023-01-01 RX ORDER — ALBUTEROL SULFATE 0.83 MG/ML
2.5 SOLUTION RESPIRATORY (INHALATION)
Status: CANCELLED | OUTPATIENT
Start: 2023-01-01

## 2023-01-01 RX ORDER — HEPARIN SODIUM (PORCINE) LOCK FLUSH IV SOLN 100 UNIT/ML 100 UNIT/ML
5 SOLUTION INTRAVENOUS
Status: CANCELLED | OUTPATIENT
Start: 2023-01-01

## 2023-01-01 RX ORDER — OXYCODONE HYDROCHLORIDE 5 MG/1
5 TABLET ORAL EVERY 6 HOURS PRN
Qty: 30 TABLET | Refills: 0 | Status: SHIPPED | OUTPATIENT
Start: 2023-01-01 | End: 2023-01-01

## 2023-01-01 RX ORDER — BENZOCAINE/MENTHOL 6 MG-10 MG
LOZENGE MUCOUS MEMBRANE
COMMUNITY
Start: 2023-01-01

## 2023-01-01 RX ORDER — HEPARIN SODIUM,PORCINE 10 UNIT/ML
5 VIAL (ML) INTRAVENOUS
Status: CANCELLED | OUTPATIENT
Start: 2023-01-01

## 2023-01-01 RX ORDER — LOPERAMIDE HYDROCHLORIDE 2 MG/1
2-4 TABLET ORAL 4 TIMES DAILY PRN
Qty: 180 TABLET | Refills: 3 | Status: SHIPPED | OUTPATIENT
Start: 2023-01-01 | End: 2023-01-01

## 2023-01-01 RX ORDER — DIPHENOXYLATE HCL/ATROPINE 2.5-.025MG
1 TABLET ORAL 4 TIMES DAILY PRN
Qty: 100 TABLET | Refills: 1 | Status: SHIPPED | OUTPATIENT
Start: 2023-01-01 | End: 2023-01-01

## 2023-01-01 RX ORDER — METHYLPREDNISOLONE SODIUM SUCCINATE 125 MG/2ML
125 INJECTION, POWDER, LYOPHILIZED, FOR SOLUTION INTRAMUSCULAR; INTRAVENOUS
Status: CANCELLED
Start: 2023-01-01

## 2023-01-01 RX ORDER — HEPARIN SODIUM (PORCINE) LOCK FLUSH IV SOLN 100 UNIT/ML 100 UNIT/ML
5 SOLUTION INTRAVENOUS
Status: DISCONTINUED | OUTPATIENT
Start: 2023-01-01 | End: 2023-01-01 | Stop reason: HOSPADM

## 2023-01-01 RX ORDER — ALBUTEROL SULFATE 90 UG/1
1-2 AEROSOL, METERED RESPIRATORY (INHALATION)
Status: CANCELLED
Start: 2023-01-01

## 2023-01-01 RX ORDER — ROSUVASTATIN CALCIUM 20 MG/1
20 TABLET, COATED ORAL DAILY
Qty: 90 TABLET | Refills: 3 | Status: SHIPPED | OUTPATIENT
Start: 2023-01-01

## 2023-01-01 RX ORDER — ENOXAPARIN SODIUM 100 MG/ML
40 INJECTION SUBCUTANEOUS DAILY
Qty: 12 ML | Refills: 1 | Status: SHIPPED | OUTPATIENT
Start: 2023-01-01 | End: 2023-01-01

## 2023-01-01 RX ORDER — EPINEPHRINE 1 MG/ML
0.3 INJECTION, SOLUTION INTRAMUSCULAR; SUBCUTANEOUS EVERY 5 MIN PRN
Status: CANCELLED | OUTPATIENT
Start: 2023-01-01

## 2023-01-01 RX ORDER — SENNOSIDES A AND B 8.6 MG/1
8.6 TABLET, FILM COATED ORAL
COMMUNITY

## 2023-01-01 RX ORDER — DIPHENHYDRAMINE HYDROCHLORIDE 50 MG/ML
50 INJECTION INTRAMUSCULAR; INTRAVENOUS
Status: CANCELLED
Start: 2023-01-01

## 2023-01-01 RX ORDER — PREDNISONE 10 MG/1
10 TABLET ORAL DAILY
COMMUNITY
Start: 2023-01-01

## 2023-01-01 RX ORDER — LACTULOSE 10 G/10G
10 SOLUTION ORAL DAILY
Qty: 30 EACH | Refills: 3 | Status: SHIPPED | OUTPATIENT
Start: 2023-01-01

## 2023-01-01 RX ORDER — EPINEPHRINE 1 MG/ML
0.3 INJECTION, SOLUTION, CONCENTRATE INTRAVENOUS EVERY 5 MIN PRN
Status: CANCELLED | OUTPATIENT
Start: 2023-01-01

## 2023-01-01 RX ORDER — ENOXAPARIN SODIUM 100 MG/ML
40 INJECTION SUBCUTANEOUS DAILY
Qty: 12 ML | Refills: 2 | Status: SHIPPED | OUTPATIENT
Start: 2023-01-01

## 2023-01-01 RX ORDER — SODIUM CHLORIDE 1 G/1
0.5 TABLET ORAL 2 TIMES DAILY
Qty: 90 TABLET | Refills: 3 | Status: SHIPPED | OUTPATIENT
Start: 2023-01-01

## 2023-01-01 RX ORDER — LEVOTHYROXINE SODIUM 112 UG/1
112 TABLET ORAL DAILY
Qty: 90 TABLET | Refills: 3 | Status: SHIPPED | OUTPATIENT
Start: 2023-01-01

## 2023-01-01 RX ORDER — OXYCODONE HYDROCHLORIDE 5 MG/1
5 TABLET ORAL EVERY 6 HOURS PRN
Qty: 90 TABLET | Refills: 0 | Status: SHIPPED | OUTPATIENT
Start: 2023-01-01 | End: 2023-01-01

## 2023-01-01 RX ORDER — LISINOPRIL 10 MG/1
10 TABLET ORAL DAILY
Qty: 90 TABLET | Refills: 1 | Status: SHIPPED | OUTPATIENT
Start: 2023-01-01 | End: 2023-01-01

## 2023-01-01 RX ORDER — MEPERIDINE HYDROCHLORIDE 25 MG/ML
25 INJECTION INTRAMUSCULAR; INTRAVENOUS; SUBCUTANEOUS EVERY 30 MIN PRN
Status: CANCELLED | OUTPATIENT
Start: 2023-01-01

## 2023-01-01 RX ORDER — ENOXAPARIN SODIUM 100 MG/ML
40 INJECTION SUBCUTANEOUS DAILY
Qty: 12 ML | Refills: 0 | Status: SHIPPED | OUTPATIENT
Start: 2023-01-01 | End: 2023-01-01

## 2023-01-01 RX ORDER — PROCHLORPERAZINE MALEATE 10 MG
TABLET ORAL
COMMUNITY

## 2023-01-01 RX ADMIN — IRON SUCROSE 300 MG: 20 INJECTION, SOLUTION INTRAVENOUS at 11:00

## 2023-01-01 RX ADMIN — SODIUM CHLORIDE 250 ML: 9 INJECTION, SOLUTION INTRAVENOUS at 10:58

## 2023-01-01 RX ADMIN — Medication 5 ML: at 12:44

## 2023-01-01 ASSESSMENT — ENCOUNTER SYMPTOMS
FEVER: 0
PARESTHESIAS: 0
SORE THROAT: 0
ABDOMINAL PAIN: 0
HEMATOCHEZIA: 0
NERVOUS/ANXIOUS: 0
EYE PAIN: 0
EYE PAIN: 0
WEAKNESS: 0
DIZZINESS: 0
DIARRHEA: 0
WOUND: 1
JOINT SWELLING: 0
SHORTNESS OF BREATH: 0
CHILLS: 0
ARTHRALGIAS: 0
DIARRHEA: 0
BREAST MASS: 0
FEVER: 0
DYSURIA: 0
HEARTBURN: 0
NERVOUS/ANXIOUS: 0
HEADACHES: 0
DYSURIA: 0
JOINT SWELLING: 0
CONSTIPATION: 0
HEMATURIA: 0
SORE THROAT: 0
HEMATURIA: 0
FEVER: 0
HEARTBURN: 0
PALPITATIONS: 0
HEMATOCHEZIA: 0
FREQUENCY: 0
PALPITATIONS: 0
ARTHRALGIAS: 0
NAUSEA: 0
WEAKNESS: 0
MYALGIAS: 0
HEADACHES: 0
FREQUENCY: 0
ABDOMINAL PAIN: 0
DIZZINESS: 0
PARESTHESIAS: 0
CHILLS: 0
MYALGIAS: 0
BREAST MASS: 0
CONSTIPATION: 0
NAUSEA: 0
SHORTNESS OF BREATH: 0

## 2023-01-01 ASSESSMENT — ACTIVITIES OF DAILY LIVING (ADL)
CURRENT_FUNCTION: NO ASSISTANCE NEEDED
CURRENT_FUNCTION: NO ASSISTANCE NEEDED

## 2023-01-01 ASSESSMENT — PAIN SCALES - GENERAL: PAINLEVEL: NO PAIN (0)

## 2023-01-12 NOTE — PROGRESS NOTES
ANTICOAGULATION MANAGEMENT     Kim Sparrow, 67 year old female on Enoxaparin    Current dosin mg every 24 hours  Administration times: 7:00 AM  Supplies: 60 mg pre filled syringe; adjusts dose to 40 mg     Goal: LMWH Anti-Xa 0.5-1.0    Recent labs: (last 7 days)     23  1120   ALMWH 0.91       Lab Results   Component Value Date    CR 1.17 2022    CR 0.74 10/30/2014       Wt Readings from Last 3 Encounters:   22 55.8 kg (123 lb)   10/17/22 58.3 kg (128 lb 9.6 oz)   22 60.3 kg (133 lb)       ASSESSMENT     Lab draw done 4 to 6 hours after last injection: Yes, confirmed with Kim today    Missed doses in last 72 hours: No    Signs or symptoms of bleeding or clotting: Occasionally continues to have slight bleeding from nose when she wakes up in AM.     PLAN     Dosing instructions: Continue current dose: 40 mg every 24 hours     Next recommended lab: 2 weeks  Lab visit scheduled    Critical priority set: Yes    Telephone call with Kim who verbalizes understanding and agrees to plan    Plan made per [facility] anticoagulation protocol    Mira Banks, RN  Anticoagulation Clinic   [phone]

## 2023-01-23 PROBLEM — N18.30 HYPERTENSIVE KIDNEY DISEASE, STAGE III (H): Status: ACTIVE | Noted: 2022-01-01

## 2023-01-23 PROBLEM — I12.9 HYPERTENSIVE KIDNEY DISEASE, STAGE III (H): Status: ACTIVE | Noted: 2022-01-01

## 2023-01-23 NOTE — PATIENT INSTRUCTIONS
Get the blood work later today!    I do recommend a mammogram      Patient Education   Personalized Prevention Plan  You are due for the preventive services outlined below.  Your care team is available to assist you in scheduling these services.  If you have already completed any of these items, please share that information with your care team to update in your medical record.  Health Maintenance Due   Topic Date Due    ANNUAL REVIEW OF HM ORDERS  Never done    Zoster (Shingles) Vaccine (1 of 2) Never done    Cholesterol Lab  04/11/2012    Colorectal Cancer Screening  10/28/2019    Pneumococcal Vaccine (1 - PCV) Never done    COVID-19 Vaccine (4 - Booster for Pfizer series) 01/17/2022       Understanding USDA MyPlate  The USDA has guidelines to help you make healthy food choices. These are called MyPlate. MyPlate shows the food groups that make up healthy meals using the image of a place setting. Before you eat, think about the healthiest choices for what to put on your plate or in your cup or bowl. To learn more about building a healthy plate, visit www.choosemyplate.gov.    The food groups  Fruits. Any fruit or 100% fruit juice counts as part of the Fruit Group. Fruits may be fresh, canned, frozen, or dried, and may be whole, cut-up, or pureed. Make 1/2 of your plate fruits and vegetables.  Vegetables. Any vegetable or 100% vegetable juice counts as a member of the Vegetable Group. Vegetables may be fresh, frozen, canned, or dried. They can be served raw or cooked and may be whole, cut-up, or mashed. Make 1/2 of your plate fruits and vegetables.  Grains. All foods made from grains are part of the Grains Group. These include wheat, rice, oats, cornmeal, and barley. Grains are often used to make foods such as bread, pasta, oatmeal, cereal, tortillas, and grits. Grains should be no more than 1/4 of your plate. At least half of your grains should be whole grains.  Protein. This group includes meat, poultry, seafood,  beans and peas, eggs, processed soy products (such as tofu), nuts (including nut butters), and seeds. Make protein choices no more than 1/4 of your plate. Meat and poultry choices should be lean or low fat.  Dairy. The Dairy Group includes all fluid milk products and foods made from milk that contain calcium, such as yogurt and cheese. (Foods that have little calcium, such as cream, butter, and cream cheese, are not part of this group.) Most dairy choices should be low-fat or fat-free.  Oils. Oils aren't a food group, but they do contain essential nutrients. However it's important to watch your intake of oils. These are fats that are liquid at room temperature. They include canola, corn, olive, soybean, vegetable, and sunflower oil. Foods that are mainly oil include mayonnaise, certain salad dressings, and soft margarines. You likely already get your daily oil allowance from the foods you eat.  Things to limit  Eating healthy also means limiting these things in your diet:     Salt (sodium). Many processed foods have a lot of sodium. To keep sodium intake down, eat fresh vegetables, meats, poultry, and seafood when possible. Purchase low-sodium, reduced-sodium, or no-salt-added food products at the store. And don't add salt to your meals at home. Instead, season them with herbs and spices such as dill, oregano, cumin, and paprika. Or try adding flavor with lemon or lime zest and juice.  Saturated fat. Saturated fats are most often found in animal products such as beef, pork, and chicken. They are often solid at room temperature, such as butter. To reduce your saturated fat intake, choose leaner cuts of meat and poultry. And try healthier cooking methods such as grilling, broiling, roasting, or baking. For a simple lower-fat swap, use plain nonfat yogurt instead of mayonnaise when making potato salad or macaroni salad.  Added sugars. These are sugars added to foods. They are in foods such as ice cream, candy, soda,  fruit drinks, sports drinks, energy drinks, cookies, pastries, jams, and syrups. Cut down on added sugars by sharing sweet treats with a family member or friend. You can also choose fruit for dessert, and drink water or other unsweetened beverages.     [employer] last reviewed this educational content on 6/1/2020 2000-2021 [employer]. All rights reserved. This information is not intended as a substitute for professional medical care. Always follow your healthcare professional's instructions.

## 2023-01-23 NOTE — PROGRESS NOTES
"SUBJECTIVE:   Kim is a 67 year old who presents for Preventive Visit.    Are you in the first 12 months of your Medicare coverage?  No    Healthy Habits:     In general, how would you rate your overall health?  Excellent    Frequency of exercise:  4-5 days/week    Duration of exercise:  15-30 minutes    Do you usually eat at least 4 servings of fruit and vegetables a day, include whole grains    & fiber and avoid regularly eating high fat or \"junk\" foods?  No    Taking medications regularly:  Yes    Medication side effects:  Not applicable    Ability to successfully perform activities of daily living:  No assistance needed    Home Safety:  No safety concerns identified    Hearing Impairment:  No hearing concerns    In the past 6 months, have you been bothered by leaking of urine?  No    In general, how would you rate your overall mental or emotional health?  Excellent      PHQ-2 Total Score: 0    Additional concerns today:  No    Taking imodium about 2 per day  Went to cabin for 4 days and had orange juice instead of cranberry  -took 3 imodium yesterday      Have you ever done Advance Care Planning? (For example, a Health Directive, POLST, or a discussion with a medical provider or your loved ones about your wishes): No, advance care planning information given to patient to review.  Patient declined advance care planning discussion at this time.    Wt Readings from Last 4 Encounters:   01/23/23 53.3 kg (117 lb 9.6 oz)   11/28/22 55.8 kg (123 lb)   10/17/22 58.3 kg (128 lb 9.6 oz)   06/08/22 60.3 kg (133 lb)          Fall risk  Fallen 2 or more times in the past year?: No  Any fall with injury in the past year?: No    Cognitive Screening   1) Repeat 3 items (Leader, Season, Table)    2) Clock draw: NORMAL  3) 3 item recall: Recalls 2 objects   Results: NORMAL clock, 1-2 items recalled: COGNITIVE IMPAIRMENT LESS LIKELY    Mini-CogTM Copyright S Krysta. Licensed by the author for use in [facility]; " reprinted with permission (soob@.[email or web address]). All rights reserved.      Do you have sleep apnea, excessive snoring or daytime drowsiness?: no    Reviewed and updated as needed this visit by clinical staff    Allergies  Meds  Problems             Reviewed and updated as needed this visit by Provider    Allergies  Meds  Problems            Social History     Tobacco Use     Smoking status: Former     Types: Cigarettes     Quit date: 1981     Years since quittin.1     Smokeless tobacco: Never     Tobacco comments:     28 y ago   Substance Use Topics     Alcohol use: Yes     Comment: occ     If you drink alcohol do you typically have >3 drinks per day or >7 drinks per week? No    No flowsheet data found.          Current providers sharing in care for this patient include:   Patient Care Team:  Jemma Alvarado MD as PCP - General (Internal Medicine - Pediatrics)  Jemma Alvarado MD as Assigned PCP  Muna Molina RN as Personal Advocate & Liaison (PAL)  Cogan, Jacob, MD as Assigned Cancer Care Provider    The following health maintenance items are reviewed in Epic and correct as of today:  Health Maintenance   Topic Date Due     ANNUAL REVIEW OF HM ORDERS  Never done     HEPATITIS C SCREENING  Never done     ZOSTER IMMUNIZATION (1 of 2) Never done     LIPID  2012     COLORECTAL CANCER SCREENING  10/28/2019     Pneumococcal Vaccine: 65+ Years (1 - PCV) Never done     COVID-19 Vaccine (4 - Booster for Pfizer series) 2022     DEXA  2023     MAMMO SCREENING  2023     MEDICARE ANNUAL WELLNESS VISIT  2024     FALL RISK ASSESSMENT  2024     DTAP/TDAP/TD IMMUNIZATION (4 - Td or Tdap) 2026     ADVANCE CARE PLANNING  2028     PHQ-2 (once per calendar year)  Completed     INFLUENZA VACCINE  Completed     IPV IMMUNIZATION  Aged Out     MENINGITIS IMMUNIZATION  Aged Out     PAP  Discontinued           FHS-7:   Breast CA Risk Assessment (FHS-7) 2022  "  Did any of your first-degree relatives have breast or ovarian cancer? Yes Yes   Did any of your relatives have bilateral breast cancer? No No   Did any man in your family have breast cancer? No No   Did any woman in your family have breast and ovarian cancer? Yes Yes   Did any woman in your family have breast cancer before age 50 y? No No   Do you have 2 or more relatives with breast and/or ovarian cancer? No No   Do you have 2 or more relatives with breast and/or bowel cancer? No No       Mammogram Screening: Recommended mammography every 1-2 years with patient discussion and risk factor consideration  Pertinent mammograms are reviewed under the imaging tab.    Review of Systems   Constitutional: Negative for chills and fever.   HENT: Negative for congestion, ear pain, hearing loss and sore throat.    Eyes: Negative for pain and visual disturbance.   Respiratory: Negative for shortness of breath.    Cardiovascular: Negative for chest pain, palpitations and peripheral edema.   Gastrointestinal: Negative for abdominal pain, constipation, diarrhea, heartburn, hematochezia and nausea.   Breasts:  Negative for tenderness, breast mass and discharge.   Genitourinary: Negative for dysuria, frequency, genital sores, hematuria, pelvic pain, urgency, vaginal bleeding and vaginal discharge.   Musculoskeletal: Negative for arthralgias, joint swelling and myalgias.   Skin: Negative for rash.   Neurological: Negative for dizziness, weakness, headaches and paresthesias.   Psychiatric/Behavioral: Negative for mood changes. The patient is not nervous/anxious.      OBJECTIVE:   /80   Pulse 99   Temp 97.6  F (36.4  C) (Tympanic)   Resp 16   Ht 1.6 m (5' 3\")   Wt 53.3 kg (117 lb 9.6 oz)   SpO2 95%   BMI 20.83 kg/m   Estimated body mass index is 20.83 kg/m  as calculated from the following:    Height as of this encounter: 1.6 m (5' 3\").    Weight as of this encounter: 53.3 kg (117 lb 9.6 oz).  Physical Exam  GENERAL: " healthy, alert and no distress  EYES: Eyes grossly normal to inspection, and conjunctivae and sclerae normal  HENT: ear canals and TM's normal  NECK: no adenopathy, no asymmetry, masses, or scars and thyroid normal to palpation  RESP: lungs clear to auscultation - no rales, rhonchi or wheezes  BREAST: mass left breast 2 oclock position, hard mobile  CV: regular rate and rhythm, normal S1 S2, no S3 or S4, no murmur, click or rub, no peripheral edema and peripheral pulses strong  ABDOMEN: soft, nontender, no hepatosplenomegaly, no masses   MS: no gross musculoskeletal defects noted, no edema  SKIN: no suspicious lesions or rashes  NEURO: Normal strength and tone, mentation intact and speech normal  PSYCH: mentation appears normal, affect normal/bright    ASSESSMENT / PLAN:       ICD-10-CM    1. Encounter for Medicare annual wellness exam  Z00.00       2. Cerebrovascular accident (CVA) due to embolism of precerebral artery (H)  I63.10 Low Molecular Weight Heparin Anti Xa Level      3. Adenocarcinoma of right lung (H)  C34.91       4. Syndrome of inappropriate secretion of antidiuretic hormone (H)  E22.2       5. Hyponatremia  E87.1 Basic metabolic panel  (Ca, Cl, CO2, Creat, Gluc, K, Na, BUN)      6. Pure hypercholesterolemia  E78.00 Lipid panel reflex to direct LDL Non-fasting      7. Screen for colon cancer  Z12.11       8. Diarrhea, unspecified type  R19.7 loperamide (IMODIUM A-D) 2 MG tablet      9. Other pulmonary embolism without acute cor pulmonale, unspecified chronicity (H)  I26.99 Low Molecular Weight Heparin Anti Xa Level      10. Primary hypothyroidism  E03.9 levothyroxine (SYNTHROID/LEVOTHROID) 112 MCG tablet     TSH with free T4 reflex      11. Calcification of left breast  R92.1         2, 9. On enoxaparin for stroke that she had while she was on apixaban for a pulmonary embolism. Gets antiXa checked regularly and has been at target. Major sequelae from stroke are some word finding difficulty and some  long term memory retrieval.  3, 8. Follows with [facility]. Diarrhea may be side effects from therapy.  4, 5. Check Na today. On salt tabs 1gm daily (half pill BID). May need to increase pending labs.  7. Delay colonoscopy until >6 months since stroke.  11. Recommend mammogram. Has history of benign calcification in same area. Patient will discuss with her oncologist.    COUNSELING:  Reviewed preventive health counseling, as reflected in patient instructions        She reports that she quit smoking about 41 years ago. Her smoking use included cigarettes. She has never used smokeless tobacco.      Appropriate preventive services were discussed with this patient, including applicable screening as appropriate for cardiovascular disease, diabetes, osteopenia/osteoporosis, and glaucoma.  As appropriate for age/gender, discussed screening for colorectal cancer, prostate cancer, breast cancer, and cervical cancer. Checklist reviewing preventive services available has been given to the patient.    Reviewed patients plan of care and provided an AVS. The Basic Care Plan (routine screening as documented in Health Maintenance) for Kim meets the Care Plan requirement. This Care Plan has been established and reviewed with the Patient.          Jemma Alvarado MD  [facility]    Identified Health Risks:

## 2023-01-23 NOTE — PROGRESS NOTES
TSH still slightly high at 6: recheck in 3 months. In setting of weight loss (diarrhea from medication side effects likely contributing) hesitate to increase thyroid dose and risk hyperthyroid state.    Elevated creatinine stable: suspect side effects from [employer].    Jemma Alvarado MD  Internal Medicine & Pediatrics  [facility] [place]  She/her

## 2023-01-24 NOTE — PROGRESS NOTES
ANTICOAGULATION MANAGEMENT     Kim Sparrow, 67 year old female on Enoxaparin    Current dosin mg every 24 hours   Administration times: 9:00  Supplies: 60 mg prefilled syringe; adjusts dose to 40 mg      Goal: LMWH Anti-Xa 0.5-1.0    Recent labs: (last 7 days)     23  1338   ALMWH 0.79       Lab Results   Component Value Date    CR 1.40 2023    CR 0.74 10/30/2014       Wt Readings from Last 3 Encounters:   23 53.3 kg (117 lb 9.6 oz)   22 55.8 kg (123 lb)   10/17/22 58.3 kg (128 lb 9.6 oz)       ASSESSMENT     Lab draw done 4 to 6 hours after last injection: Yes, per chart review    Missed doses in last 72 hours: No    Signs or symptoms of bleeding or clotting:     PLAN     Dosing instructions: Continue current dose: 40 mg every 24 hours     Next recommended lab: 3 weeks  Contact [phone] to schedule and with any changes, questions or concerns.     Critical priority set: Yes    Telephone call with Kim who verbalizes understanding and agrees to plan and who agrees to plan and repeated back plan correctly    Plan made per [facility] anticoagulation protocol    CHELSEA FLORES RN  Anticoagulation Clinic   [phone]

## 2023-01-31 NOTE — TELEPHONE ENCOUNTER
"Patient is a little concerned about waiting two weeks to re-check the Hgb, and is wondering about doing that sooner, or at least checking the Iron studies sooner?  Is also asking about starting an Iron supplement prior to checking the lab work?  Patient has no active bleeding at this time.  I did advise patient to contact me if she notices blood in the stool or if she has any blood in emesis, if this occurs.  I spoke with patient and her , and they were told by the Oncologist that he is concerned that she may be bleeding \"in her gut\".   They asked how this could be checked, and I advised them that patient would need a Colonoscopy and/or EGD to check for this, if it is determined that this is needed.   However, the first step is to check the labs to determine if the anemia may be related to an Iron deficiency.  Patient was taken off Eliquis and started on Lovenox injections.    Initially was given injections twice daily and is now on 0.4 ml daily.  STEPHANIE Molina RN      "

## 2023-01-31 NOTE — TELEPHONE ENCOUNTER
Recommend recheck hemoglobin in 2 weeks with other labs (iron, smear, retic, epo).    Recommendations will be based on findings.    Differential diagnosis includes iron deficiency anemia, anemia of chronic disease, CKD (chronic kidney disease) related anemia, medication side effects.    If iron deficiency anemia should consider colonoscopy/endoscopy.    eJmma Alvarado MD  Internal Medicine & Pediatrics  [facility] [place]  She/her

## 2023-01-31 NOTE — TELEPHONE ENCOUNTER
Per routing comment from Dr. Alvarado-  Can schedule labs sooner if she prefers. Recommend visit with me after to discuss lab results and next steps.      SIN Molina RN

## 2023-01-31 NOTE — TELEPHONE ENCOUNTER
Patient sent My chart message that she had a visit with Dr. Eckert at [facility] yesterday.  He requests that low Hgb be monitored.  Patient asking about recommendations to increase Hgb.  I don't see any Iron studies done.  Lab results from [facility]-    [facility] 01/30/23 10/30/22 10/29/22 10/21/22 08/11/22 06/13/22   Hemoglobin 9.8 Low  11.9 11.3 Low  11 Low  12.2 11.2 Low    Hematocrit 30 Low  34.7 Low  32.4 Low  33 Low  35 Low  32.9 Low    Erythrocytes 3.11 Low  3.48 Low  3.24 Low  3.25 Low  3.63 Low  3.46 Low    MCV 96.5 99.7 High  100 High  101.5 High  96.4 95.1   RBC Distrib Width 12.4 11.9 Low  11.6 Low  12.3 13.7 12.2   Platelet Count 270 267 237 223 221 302   Leukocytes 6.3 6.7 7 6.9 6.2 5.9   Neutrophils 5.22 5.16 5.83 5.57 4.99 4.22   Lymphocytes 0.48 Low  0.57 Low  0.45 Low  0.49 Low  0.54 Low  0.78 Low    Monocytes 0.47 0.74 0.52 0.56 0.53 0.62   Eosinophils 0.09 0.18 0.14 0.2 0.14 0.19   Basophils 0.04 0.05       STEPHANIE Molina RN

## 2023-01-31 NOTE — TELEPHONE ENCOUNTER
I called patient back and she is scheduled for a lab appointment on Thursday, 02/02 and follow-up appointment with  on Monday afternoon.  Patient voices understanding of instructions.  No further questions.  Will call back if any other questions or concerns.  STEPHANIE Molina RN

## 2023-02-01 NOTE — TELEPHONE ENCOUNTER
My Chart message sent to patient that we can change the appointment on Monday to a video visit, if she would prefer this.  STEPHANIE Molina RN

## 2023-02-01 NOTE — TELEPHONE ENCOUNTER
"[id number]  Kim Sparrow  67 year old female  [facility] Diagnosis: recurrent blood clots and stroke  [facility] Provider: Dr. Cogan    Incoming call from patient regarding anticoagulation. Kim takes 0.4 mLs (40 mg) of Lovenox daily via injection. She has metastatic lung adenocarcinoma and recurrent blood clots, most recently symptomatic strokes despite therapeutic apixaban.    Kim states that she was seen at [facility] on Monday 1/30 where the doctor told her \"she could be bleeding into her stomach.\" Per patient and records from [facility], her hemoglobin dropped to 9.8. She denies dark, tarry stools or hematemesis. She states that she has had a \"pit in her stomach\" and intermittent abdominal tenderness the last two days that is relieved by coughing/spitting up foamy bile. She denies any blood in this bile. She states that it sometimes feels as if there is a reflux causing the coughing/spitting up of bile. She denies any new dizziness or weakness. She states that she had these symptoms previously and was switched from Atorvastatin to Rosuvastatin and that change helped her symptoms at that time (see note mychart medical encounter on 1/12/23 with Dr. Alvarado.     Kim is scheduled to see Dr. Cogan for a video visit on 2/7/23 to further discuss anticoagulation, but she is wondering if she can stop her lovenox now and/or if there is a different route to take. Of note, she is scheduled for follow-up labs including iron studies and hemoglobin recheck tomorrow 2/2/23. Her PCP Dr. Alvarado is aware of these symptoms as well.    Tasneme Ruth RN, BSN, PCCN  Nurse Clinician    [facility]  [address] [place] [address], [address], [place] [address]   Office, direct: [phone]  Main office number: [phone]  Pronouns: She, her, hers          "

## 2023-02-01 NOTE — TELEPHONE ENCOUNTER
"[id number]  Kim Sparrow  67 year old female  [facility] Diagnosis: recurrent blood clots and stroke  [facility] Provider: Dr. Cogan Dr. Cogan reviewed RN message regarding anticoagulation questions. He responded with the following instructions:    \"Based on this, it does not sound to me like anything urgent needs to be done.  She definitely should not stop her blood thinner, and we cannot change her to another one until I see her and discuss.  She should keep an eye on her symptoms, and if it is really not getting better or she is concerned she should should go to the ER, but at the moment I do not think there is a need for imaging or anything else emergent. But I will take a look at the labs she gets tomorrow.\"      RN reviewed above information with patient. Reiterated that blood in the stool, blood in vomit, and/or new weakness/dizziness would necessitate a trip to the emergency department. Pt denies any other questions or concerns.    Tasneem Ruth RN, BSN, PCCN  Nurse Clinician    [facility]  [address] [place], [place] [address]   Office, direct: [phone]  Main office number: [phone]  Pronouns: She, her, hers    Addendum 2/3/23:  Pt's Hgb came back at 10.4 (drawn at 2/2/23). Pt denied any active bleeding or new weakness/dizziness. She is still having same abdominal tenderness, gas pains, \"acid reflux\" type symptoms, and fullness. RN reiterated to patient to seek emergency care if she has any bleeding symptoms and/or new weakness. Dr. Cogan notified. Pt is still planning on appointment as scheduled next week.     Tasneem Ruth RN, BSN, PCCN  Nurse Clinician    [facility]  [address] [place] [address], [address] [place] [id number]   Office, direct: [phone]  Main office number: [phone]  Pronouns: She, her, hers            "

## 2023-02-02 NOTE — TELEPHONE ENCOUNTER
Patient is requesting that appointment on Monday be a phone visit because she has to go to [facility] on Monday at 1245.  Appointment changed to phone visit per patient request and patient notified.  STEPHANIE Molina RN

## 2023-02-03 NOTE — PROGRESS NOTES
[facility]  [address] [place] [id number]  Phone: [phone], Fax: [phone]    Outpatient Visit Note:    Patient: Kim Sparrow  MRN: [id number]  : 1955  ERIK: 2023    Assessment:  Kim Sparrow is a 66 year old woman with metastatic lung adenocarcinoma and recurrent blood clots (including strokes despite apixaban), now on enoxaparin, with recent development of anemia.    She seems to be doing well on once daily enoxaparin, with anti-Xa levels all in the goal range over the last few months.  We discussed that since she has been doing well on this therapy, I would not recommend a change to another agent.  DOACs are not an option for her given the strokes despite being on a DOAC previously, and while warfarin is an option we could consider if the injections become too much for her to tolerate, I would be uncomfortable changing her therapy at the moment given that she has been doing well on that and that she seems amenable to continuing the injections.  She is in agreement with this.    She has recently developed anemia, with the most recent hemoglobin of 10.4 g/dL.  It has been suggested to her that this may represent bleeding.  There has been no overt GI bleeding or melena, and while intra-abdominal bleeding is possible, one would expect such anemia to be progressive on sequential hemoglobins (rather than stable to improved as has been demonstrated), and for her to be more ill than she currently appears.  She has become progressively more macrocytic over time.  Her reticulocyte count is inappropriately normal.  Iron studies reveal ferritin of 219 ng/mL, and a transferrin saturation of 11%.  She has an elevated TSH but a normal free T4.  Her erythropoietin level is normal.  She does have CKD, though there appears to be chronicity to this.  Of note she appears to have been anemic since at least , without a dramatic change in her hemoglobin.    At  this point, my suspicion is for a mixed picture of iron deficiency and anemia of chronic disease in the context of her malignancy.  The iron deficiency may have arisen due to occult bleeding from anticoagulation, though again her hemoglobin is not dramatically different from her baseline.  A trial of iron repletion is appropriate, and I will write for IV iron given that her iron deficiency is occurring in the context of active malignancy.  It is also possible that this is anemia of chronic renal disease, supported by her normal erythropoietin level despite her anemia.  However iron repletion is still reasonable therapy in this situation, since one would expect her ferritin to be higher than it is with an active malignancy and chronic kidney disease.     Plan:  -Continue Lovenox, dosing per anticoagulation clinic  -Will attempt to arrange for 0.4 mL syringes of Lovenox to be delivered to her  -Iron sucrose 300 mg IV for 3 doses  -Return in 3 months -if anemia persists, pursue B12 and folate testing    The patient is given our center's contact information and is instructed to call if she should have any further questions or concerns.    Inessa Alcazar, nurse clinician, is assigned to provide patient care coordination and education.     Patient understands and agrees with the above plan and recommendation.    Jacob Cogan, MD  Hematology    40 minutes spent on the date of the encounter doing chart review, history and exam, documentation and further activities per the note    ----------------------------------------------------------------------------------------------------------------------  History of Present Illness:  Kim Sparrow is a 66 year old woman with a history of metastatic lung adenocarcinoma (s/p XRT and multiple chemotherapies C/B bony mets, on mobocertinib), pulmonary embolism (judged to be unprovoked, 2020, was on apixaban), CKD, recent strokes now on Lovenox monitored by anti-Xa levels, referred to  hematology for management of anticoagulation.    She had a brain MRI on October 21 to screen for brain mets.  MRI showed several acute/subacute infarcts in multiple vascular territories most prominent in the occipital lobe.  Then on October 22 she developed vision difficulties on the left which improved gradually.  Then on October 29 she had word finding difficulties and went to [facility].  Found to have new frontal and parietal infarcts.  CTA showed occlusion of the left MCA.  Not a candidate for thrombolytics or endovascular therapy.  Overall concerning for proximal embolic source.  IRVING not obtained due to esophageal stricture.  TTE with no evidence of thrombus.  Apixaban changed to Lovenox.    She is currently on Lovenox once daily, managed by the anticoagulation clinic.  Her anti-Xa levels have been high, leading to the once daily dosage currently.  She dislikes the shots but overall feels better.  Has occasional nosebleeds when blowing her nose in the morning, but otherwise no bleeding or bruising.    February 7, 2023: She continues on enoxaparin.  We received a call that she was seen at [facility] on January 30 and apparently was told that she could be bleeding into her abdomen.  Her hemoglobin had dropped from normal to 9.8 g/dL.  She reported a feeling of a pit in her stomach and abdominal tenderness.  She has had some coughing/spitting up of bile.  There is a feeling of reflux.  She had a repeat hemoglobin earlier this week that was stable to improved.  At this appointment with [facility] she also had an MRI brain and PET/CT reviewed, without any clear evidence of disease recurrence or progression.  She also had a CT scan of her neck that showed a likely metastasis in the C1 vertebral body. She continues on her mobocertinib therapy. Will start SBRT soon - unclear if this represents a true recurrence.  She feels okay currently.  No active bleeding or bruising, though ongoing GI symptoms.  Would prefer a pill for  anticoagulation, but is not overly distressed by the injections of Lovenox, which her  administers to her.  Would prefer the 0.4 mL syringes.      Past Medical History:  Past Medical History:   Diagnosis Date     Diverticulitis 8/6/2014     Diverticulitis of colon 9/7/2012     Diverticulosis 12/1/08    mild, diagnosed on screening colonoscopy     Other and unspecified hyperlipidemia        Past Surgical History:  Past Surgical History:   Procedure Laterality Date     COLONOSCOPY  10/28/2014    Dr. Juarez [facility]     LAPAROSCOPIC ASSISTED COLECTOMY LEFT (DESCENDING) N/A 10/29/2014    Procedure: LAPAROSCOPIC ASSISTED COLECTOMY LEFT (DESCENDING);  Surgeon: Kim Juarez MD;  Location: [facility] OR     TUBAL LIGATION  1995     wisdom teeth extraction         Medications:  Current Outpatient Medications   Medication Sig Dispense Refill     acetaminophen (TYLENOL) 500 MG tablet Take 500-1,000 mg by mouth every 6 hours as needed       diphenhydrAMINE-acetaminophen (TYLENOL PM)  MG tablet Take 1 tablet by mouth nightly as needed       enoxaparin ANTICOAGULANT (LOVENOX) 60 MG/0.6ML syringe Inject 0.4 mLs (40 mg) Subcutaneous every 24 hours 14.4 mL 3     EXKIVITY 40 MG capsule        levothyroxine (SYNTHROID/LEVOTHROID) 112 MCG tablet Take 1 tablet (112 mcg) by mouth daily 90 tablet 3     lisinopril (ZESTRIL) 10 MG tablet Take 10 mg by mouth daily       loperamide (IMODIUM A-D) 2 MG tablet Take 1-2 tablets (2-4 mg) by mouth 4 times daily as needed for diarrhea 180 tablet 3     LORazepam (ATIVAN) 0.5 MG tablet As need for anxiety every 6 hours.  Try one half tab. (Patient not taking: Reported on 11/28/2022)       ondansetron (ZOFRAN) 8 MG tablet Take 4 mg by mouth daily as needed Take daily when on mo       oxyCODONE (ROXICODONE) 5 MG tablet Take 5 mg by mouth every 6 hours as needed       rosuvastatin (CRESTOR) 40 MG tablet Take 1 tablet (40 mg) by mouth daily 90 tablet 3     sodium chloride 1 GM tablet Take 1 g by  mouth 2 times daily          Allergies:  Allergies   Allergen Reactions     Carboplatin Itching     Itching in hands and feet.         ROS:  Remainder of a comprehensive 14 point ROS is negative unless noted above.    Social History:  Denies any tobacco use. No significant alcohol use. Denies any illicit drug use.     Family History:  Non-contributory to the current presentation    Objectives:  N/A, video visit    Labs:  WBC   Date Value Ref Range Status   08/07/2014 7.1 4.0 - 11.0 10e9/L Final   08/06/2014 11.0 4.0 - 11.0 10e9/L Final   07/07/2014 8.4 4.0 - 11.0 10e9/L Final   09/07/2012 8.3 4.0 - 11.0 10e9/L Final     WBC Count   Date Value Ref Range Status   02/02/2023 6.1 4.0 - 11.0 10e3/uL Final     Hemoglobin   Date Value Ref Range Status   02/02/2023 10.4 (L) 11.7 - 15.7 g/dL Final   10/28/2014 14.3 11.7 - 15.7 g/dL Final   08/07/2014 12.2 11.7 - 15.7 g/dL Final   08/06/2014 13.2 11.7 - 15.7 g/dL Final   07/07/2014 13.0 11.7 - 15.7 g/dL Final     Hematocrit   Date Value Ref Range Status   02/02/2023 32.0 (L) 35.0 - 47.0 % Final   08/07/2014 35.8 35.0 - 47.0 % Final   08/06/2014 39.5 35.0 - 47.0 % Final   07/07/2014 37.9 35.0 - 47.0 % Final   09/07/2012 40.1 35.0 - 47.0 % Final     Platelet Count   Date Value Ref Range Status   02/02/2023 291 150 - 450 10e3/uL Final   11/01/2014 206 150 - 450 10e9/L Final   10/30/2014 196 150 - 450 10e9/L Final   08/07/2014 235 150 - 450 10e9/L Final   08/06/2014 265 150 - 450 10e9/L Final         Imaging:  Mammogram - HIM Scan  Mammo 10/15/14   [facility]  [place]     Video-Visit Details:  Type of service:  Video Visit  Video Start Time: 2:05  Video End Time (time video stopped): 2:34  Originating Location (pt. Location): Home  Distant Location (provider location):  [facility]   Mode of Communication:  Video Conference via [email or web address]

## 2023-02-06 NOTE — PROGRESS NOTES
Kim is a 67 year old who is being evaluated via a billable telephone visit.        Assessment & Plan     Anemia, unspecified type  Encounter for screening for malignant neoplasm of colon  Patient with anemia - borderline low iron studies with normal ferritin (possible elevated due to inflammation?). Differential diagnosis includes gastroenterology loss, medication side effect. Discussion with patient and  about proceeding to scopes in setting of recent stroke and being on anticoagulation. Plan to obtain FOBT and monitor hemoglobin - has appointment with hematologist tomorrow regarding anticoagulation. Would recheck hemoglobin in 1 week or depending on blood draw plans after tomorrow appointment.  Patient also reports she is starting radiation to her C1 vertebra so sedation at this time may also not be ideal.  - Fecal colorectal cancer screen (FIT); Future  - Hemoglobin; Standing    Hyponatremia  - sodium chloride 1 GM tablet; Take 0.5 tablets (0.5 g) by mouth 2 times daily    Jemma Alvarado MD  [facility] ANNE-MARIE Alvarez is a 67 year old, presenting for the following health issues:  No chief complaint on file.      History of Present Illness       Reason for visit:  Blood testShe consumes 1 sweetened beverage(s) daily.She exercises with enough effort to increase her heart rate 10 to 19 minutes per day.  She exercises with enough effort to increase her heart rate 6 days per week.   She is taking medications regularly.         Objective       Vitals:  No vitals were obtained today due to virtual visit.    Physical Exam   healthy, alert and no distress  PSYCH: Alert and oriented times 3; coherent speech, normal   rate and volume, able to articulate logical thoughts, able   to abstract reason, no tangential thoughts, no hallucinations   or delusions  Her affect is normal  RESP: No cough, no audible wheezing, able to talk in full sentences  Remainder of exam unable to be completed  due to telephone visits      Phone call duration: 15 minutes

## 2023-02-07 PROBLEM — D50.0 IRON DEFICIENCY ANEMIA DUE TO CHRONIC BLOOD LOSS: Status: ACTIVE | Noted: 2023-01-01

## 2023-02-07 NOTE — PROGRESS NOTES
Patient was contacted to complete the pre-visit call prior to their video visit with the provider.      Allergies and medications were reviewed.    I thanked them for their time to cover this information     Massiel Murillo CMA

## 2023-02-09 NOTE — TELEPHONE ENCOUNTER
[id number]  Kim Sparrow  67 year old female  CBCD Diagnosis: recurrent blood clots and  metastatic lung cancer  CBCD Provider: Jacob Cogan, MD    Kim Sparrow called in follow up after her visit with Dr. Cogan on 2/7/2023. She has questions about Lovenox Rx and IV iron.    She is currently on Lovenox 40 mg daily and had been prescribed 60 mg syringes, which required adjusting dose each time.  She is wondering  If she can get 40 mg syringes.  Spoke to Medication monitoring who has been following her, getting low molecular weight heparin levels periodically.  They confirmed that she has never needed more than the 40 mg dosing.  Rx placed per verbal okay from Dr. Cogan for the Lovenox 40 mg syringes.  Tracy is due for another LMWH level on 2/14/2023.  We will review that result and determine if the medication monitoring piece can be stopped.    Tracy wondered about timing of the ordered iron sucrose.  I told her that she could proceed with this whenever her schedule allows.  She wants to go to the infusion center in [place].  She has lots of other appts so provided her with the scheduling number and she was fine with calling to set this up.  She agreed to call back it she had questions.    Hope MALDONADON, RN   Nurse Clinician  [facility]  Office: [phone]  Main Office: [phone] (ask to speak with a nurse)  Fax: [phone]

## 2023-02-14 NOTE — TELEPHONE ENCOUNTER
[id number]  Kim Sparrow  67 year old female  CBCD Diagnosis: recurrent blood clots (despite Apixaban)/ recent anemia  CBCD Provider: Dr. Cogan    RN received a phone call from Tracy regarding symptoms post IV iron infusion (venofer 1/3 doses). She reports increased abdominal discomfort and distension. She reports it developed after her infusion and she is extremely uncomfortable. RN reviewed with alternative provider as Dr. Cogan is out today. This is not typical of venofer- we are recommending she starts with an over the counter medication such as simethicone and see if this alleviates her symptoms. RN will follow up with both Tracy and Dr. Cogan tomorrow to see if she should proceed with subsequent infusions.     Nuha Sher  MSN, RN, [facility]  Office: [phone]  Fax: [phone]    Addendum  RN did call Tracy back for an update regarding her abdominal pain/distention. She reports she had a rough night had horrible pain on and off until 10:30/11:00pm when it finally subsided and she fell asleep. She took 2 simethicone tablets (1 at 4 pm and 1 at 6 pm) and some tums. She did pass some gas and had temporary relief after flatulence. She is feeling much better this AM. RN did inquire about her thoughts regarding future infusions and she does not want to proceed with these in fear of repeat pain. She is scheduled for her second dose of venofer on 2/20/23. RN will discuss with Dr. Cogan and call Tracy back with our recommendations. She did okay detailed voicemail's if she does not answer.    Nuha Sher  MSN, RN, [facility]  Office: [phone]  Fax: [phone]

## 2023-02-14 NOTE — PROGRESS NOTES
Infusion Nursing Note:  Kim Sparrow presents today for 1 of 3 venofer.    Patient seen by provider today: No   present during visit today: Not Applicable.    Note: Verbal education provided to patient and/or family regarding possible side effects.     Kim starts radiation tomorrow and needs to figure out times prior to scheduling her other 2 infusion appointments.    Intravenous Access:  Implanted Port - chest.    Treatment Conditions:  Not Applicable.    Post Infusion Assessment:  Patient tolerated infusion without incident.  Blood return noted pre and post infusion.  Site patent and intact, free from redness, edema or discomfort.  No evidence of extravasations.  Access discontinued per protocol.     Discharge Plan:   Discharge instructions reviewed with: Patient.  Patient and/or family verbalized understanding of discharge instructions and all questions answered.  AVS to patient via [employer].  Patient will return 2/20 at [facility] for next appointment.   Patient discharged in stable condition accompanied by: self and .  Departure Mode: Ambulatory.      Ranjit Gonzales RN

## 2023-02-16 NOTE — TELEPHONE ENCOUNTER
"Tracy called with updates and questions about medication.  On 01/11-Atorvastatin was discontinued and Crestor 40 mg was started because patient felt that the Atorvastatin was causing stomach symptoms.  Since making the change, she notes that she still gets full quickly when she eats, she is taking Imodium twice daily for diarrhea stools, she gets generalized stomach pain \"once in awhile\", and feels like \"gassiness backs into her throat\"-and this is not related to eating.   She doesn't feel like she has acid reflux.    1. She wants to decrease the dose of the Crestor.    I advised patient that she is on a higher dose of Crestor due to her stroke history.   She would like Dr. Alvarado to consider this.    2. Patient has chronic left sided back pain that is located in mid back \"behind my lung\".    On occasion she will use Oxycodone 5 mg to treat this.   She states that Dr. Alvarado indicated that she would order this for patient when she needs it.  Order pended.    3. Patient had her first Iron infusion this week and the night of the infusion she had significant abdominal pressure, and was very uncomfortable.    She was directed to take GasX, which did help relieve the pressure.   She has placed a call to Hematology, because she doesn't want to continue with the IV infusions, and is going to ask the Hematologist about alternatives.    Patient has radiation this afternoon, so may respond thru My chart or LM on her phone.  Forwarded to provider for review.  STEPHANIE Molina RN        "

## 2023-02-16 NOTE — TELEPHONE ENCOUNTER
Oxycodone prescription signed.    Crestor 20mg signed. Agree not ideal with her medical history.    Jemma Alvarado MD  Internal Medicine & Pediatrics  [facility] [place]  She/her

## 2023-02-16 NOTE — TELEPHONE ENCOUNTER
"I advised patient that prescriptions have been sent to the pharmacy, and that a lower dose of Crestor has been ordered.  Patient will call me in two weeks with an update on the GI symptoms on the lower dose of Crestor.  Patient did ask about using Advil vs Tylenol.   I advised her that she should avoid Advil and that class of medication while on the anticoagulants.   Patient then stated \"that's what Dr. Alvarado told me\".  She does get pain relief with Tylenol and will continue to use this.  No further questions.  Will call back if any other questions or concerns.  STEPHANIE Molina RN    "

## 2023-02-20 NOTE — PROGRESS NOTES
Patient starting PO iron this week. Per Dr. Cogan, to get labs checked prior to 5/9/23 appt.    Lani MALDONADON, RN, PHN   [facility]   Office: [phone]  Fax: [phone]

## 2023-02-20 NOTE — TELEPHONE ENCOUNTER
[id number]  iKm Sparrow  67 year old female  CBCD Diagnosis: recurrent blood clots (despite Apixaban)/ recent anemia  CBCD Provider: Dr. Cogan    Call received from Kim to discuss upcoming iron infusions. She is wanting to cancel those appointments due to the side effect of gi upset that lasted several hours and was mildly improved with OTC gas-x. Per Dr. Cogan, Kim can cancel those appointments and begin oral iron every other day. Staff informed Kim of this and instructed her to get her iron tablets OTC or Dr. Cogan and provide an Rx. Discussed expected side effects. Kim agrees to this plan and is aware that labs can be checked in about 3 months to assess the impact of the iron. She denies additional questions at this time, confirms that her stomach/ gi pain has resolved and will call back prn.     She is next scheduled with Dr. Cogan 5/9/23.    Lani LANDEROS, RN, PHN   [facility]   Office: [phone]  Fax: [phone]       Addendum:    Call back to Kim who states she picked up ferrous sulfate 325mg from the pharmacy and will let the CBCD know if she wants to have a Rx from Dr. Cogan. Kim is agreeable to labs prior to her May appointment and requests the [facility] lab mid morning on May 4th or 5th.    Lani LANDEROS, RN, PHN   [facility]   Office: [phone]  Fax: [phone]

## 2023-02-22 NOTE — TELEPHONE ENCOUNTER
[id number]  Kim Sparrow  67 year old female  CBCD Diagnosis: recurrent VTE  CBCD Provider: Cogan Sandy called today looking for a refill on her enoxaparin sodium. Per chart review Dr. Cogan would like her remain on this once daily until he sees her again. She is scheduled in May. A refill was given to get her until this appointment.    Nuha Sher  MSN, RN, PHN  [facility]  Office: [phone]  Fax: [phone]

## 2023-02-23 NOTE — TELEPHONE ENCOUNTER
----- Message from Jacob Cogan, MD sent at 2/23/2023  1:31 PM CST -----  Regarding: RE: iron orders  Yes that is fine, thank you!  ----- Message -----  From: Jennifer Mayo RN  Sent: 2/23/2023   1:27 PM CST  To: Jacob Cogan, MD  Subject: iron orders                                      Hey Dr. Cogan,    Per the pt she received one dose of the iron and does not want to get the other 2 doses.  If you are okay with this, can I delete the therapy plan so it comes out of our schedulers workque?    Thank you,    Jennifer Mayo RN   [facility]  [facility]  [phone]

## 2023-03-02 NOTE — TELEPHONE ENCOUNTER
I called patient.  Dr. Alvarado sent the RX to the [facility] in [place].  I called [facility] and cancelled the Lomotil RX that was sent to the [facility] pharmacy.  Patient notified that RX has been sent to the pharmacy in [place].  Dr. Alvarado advises that patient may take Pepto Bismol per directions on the bottle, if needed.  Advised patient to stop the Imodium when she begins the Lomotil.  Patient voices understanding of instructions.  No further questions.  Will call back if any other questions or concerns.  STEPHANIE Molina RN     Patient definitly shows S&S of dementia. Very poor memory, needs frequent re-orientation. Still knows month, year & hospital.  Daughter called, asked to speak with patient. Realized patients confusion is getting worse. I did explain to family earlier, that sometimes dementia can be worse in the hospital. Patient keeps asking for someone to  his mother(she  many years ago). I have updated family with this, that was the first person he asked for after extubation. When I explain  That his mother has , he says \"he knows this\"! But seems aware that his confusion is worse at times.

## 2023-03-02 NOTE — PATIENT INSTRUCTIONS
Thank you for choosing us for your care. I have placed an order for a prescription so that you can start treatment. View your full visit summary for details by clicking on the link below. Your pharmacist will able to address any questions you may have about the medication.     If you're not feeling better within 5-7 days, please schedule an appointment.  You can schedule an appointment right here in [facility], or call [phone]  If the visit is for the same symptoms as your eVisit, we'll refund the cost of your eVisit if seen within seven days.

## 2023-03-02 NOTE — TELEPHONE ENCOUNTER
Diarrhea does seem most likely medication side effect.    If worsens, she should be seen somewhere for infectious work up.    Lomotil would probably be fine - if she wants a prescription I recommend an evisit.      Jemma Alvarado MD  Internal Medicine & Pediatrics  [facility]  She/her

## 2023-03-02 NOTE — TELEPHONE ENCOUNTER
Patient is interested in taking Lomotil, and is in agreement with setting up an Evisit.  No further questions.  Will call back if any other questions or concerns.  STEPHANIE Molina RN

## 2023-03-02 NOTE — TELEPHONE ENCOUNTER
I called patient for an update on GI symptoms.  1. She is asking about trying Pepto Bismol to treat diarrhea?  2. Hematologist started her on Iron gummies -18 mg twice daily.   She increased the dose to 4 gummies daily because Iron should cause constipation.  3. An acquaintance mentioned trying Lomotil-what are Dr. Alvarado's thoughts about this?  Patient reports that she continues to have 3-4 loose stools daily.   Describes the stools as liquid.  Denies any blood in the stool.  Stool is not black in color.  Diarrhea seems to be worse in the past 3-4 days.  Patient was taking Imodium -three tablets daily, which had been helping.   Due to increased diarrhea, she increased this to four tablets daily, which caused nausea and emesis of bile.   So yesterday she didn't take any Imodium.  There doesn't seem to be any pattern to the diarrhea episodes.   Once in awhile, she may have slight abdominal pain prior to the diarrhea, and this resolves after passing stool.  Patient hasn't noticed any change in how her clothes fit, so she doesn't think she is losing any weight.  Patient has had issues with diarrhea since she began taking Exkivity to treat the lung cancer.  Patient takes 40 mg daily, and is unsure if she will remain on this medication indefinitely.  Patient is currently in [place], and may be there for the next two weeks.  Forwarded to provider for review.  Please advise, or would you like a phone visit to discuss?  STEPHANIE Molina RN

## 2023-04-03 NOTE — PROGRESS NOTES
Assessment & Plan:        ICD-10-CM    1. Skin abrasion  T14.8XXA             Plan/Clinical Decision Making:    Patient has small superficial wound 1cm of left shin. No signs of infection.   Can keep dry and clean.       Return if symptoms worsen or fail to improve, for in 5-7 days.     At the end of the encounter, I discussed results, diagnosis, medications. Discussed red flags for immediate return to clinic/ER, as well as indications for follow up if no improvement. Patient understood and agreed to plan. Patient was stable for discharge.        Eun Candelaria PA-C on 4/3/2023 at 3:22 PM          Subjective:     HPI:    Kim is a 67 year old female who presents to clinic today for the following health issues:  Chief Complaint   Patient presents with     Mass     1 week, left shin, hit it on tub, discharge, not healing     HPI    Patient bump left shin a week ago on Thursday. Has small 1 cm sore. No purulent drainage.   No fever. No increased pain. Bandaging every day.     History obtained from the patient.    Review of Systems   Constitutional: Negative for fever.   Skin: Positive for wound.         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)     Other pulmonary embolism without acute cor pulmonale, unspecified chronicity (H)     Cerebrovascular accident (CVA) due to embolism of precerebral artery (H)     Hypertensive kidney disease, stage III (H)     Iron deficiency anemia due to chronic blood loss        Past Medical History:   Diagnosis Date     Diverticulitis 8/6/2014     Diverticulitis of colon 9/7/2012     Diverticulosis 12/1/08    mild,  diagnosed on screening colonoscopy     Other and unspecified hyperlipidemia        Social History     Tobacco Use     Smoking status: Former     Types: Cigarettes     Quit date: 1981     Years since quittin.4     Smokeless tobacco: Never     Tobacco comments:     28 y ago   Vaping Use     Vaping status: Not on file   Substance Use Topics     Alcohol use: Yes     Comment: occ             Objective:     Vitals:    23 1458   BP: (!) 140/78   BP Location: Right arm   Pulse: 101   Resp: 16   Temp: 97.4  F (36.3  C)   SpO2: 97%         Physical Exam   EXAM:   Pleasant, alert, appropriate appearance. NAD.  Head Exam: Normocephalic, atraumatic.  Ext/musculoskeletal: Grossly intact, No edema,  Neuro: CN II-XII intact grossly intact.  Skin: left shin with 1 cm superficial wound, no drainage, no erythema.       Results:  No results found for any visits on 23.

## 2023-04-05 NOTE — TELEPHONE ENCOUNTER
[facility] requesting 12 hour hold/bridge plan.    Procedure: Colonoscopy    Date of Procedure 5/11/23    Provider:  Dr. Johnson    Please call orders to:  [phone]    Routing to Pharmacist for plan consideration.

## 2023-04-11 NOTE — TELEPHONE ENCOUNTER
Dr. Alvarado, patient has sent a My Chart message that she is scheduled for a Colonoscopy, not an EGD.  I did pend an order for an EGD due to dysphagia.    Or any other recommendations?  Please sign if in agreement.  Thank you  STEPHANIE Molina RN

## 2023-04-11 NOTE — TELEPHONE ENCOUNTER
I spoke with patient and advised her that Dr. Alvarado did submit an order for an EGD.  I asked if she would prefer to get the EGD and Colonoscopy done on the same day.   Patient does prefer to complete on the same day.  I called [facility] nurse line and was told that the  will contact patient.    She may have to schedule appointment a little further out to be able to do both procedures in one day, but the  will discuss this with patient.    Forwarded to referral team for approval.  STEPHANIE Molina RN

## 2023-04-11 NOTE — TELEPHONE ENCOUNTER
"1. Oxycodone 5 mg      Last Written Prescription Date:  02/16/23  Last Fill Quantity: 30,   # refills: 0  Last Office Visit: Virtual visit-02/06/23  Future Office visit:   None scheduled    Routing refill request to provider for review/approval because:  Drug not on the [facility], [facility] or [facility] refill protocol or controlled substance    2.  My Chart message sent that patient is experiencing dysphagia which occurs while eating steak or chicken.   It occurs \"once in awhile\".   Feels like something \"sticks and can't eat or drink anything\".     When she tries to eat steak or chicken, she has to \"spit it up\".     History of radiation to the esophagus Jan 2019.  Referral pended for GI for EGD-please sign if in agreement vs virtual visit.  Forwarded to provider for review.  Thank you.  STEPHANIE Molina RN    "

## 2023-04-11 NOTE — TELEPHONE ENCOUNTER
EGD signed.    Jemma Alvarado MD  Internal Medicine & Pediatrics  [facility]  She/her

## 2023-04-11 NOTE — TELEPHONE ENCOUNTER
I called [facility], and spoke with MIKALA Singh.    I advised her that Dr. Alvarado gave approval for the 12 hr hold of the Lovenox.  Samantha stated that they will contact patient with these instructions.  No further questions.  She will call back if any other questions or concerns.    My Chart message sent to patient to notify her that RX for Oxycodone was sent to the pharmacy.  Also advised her that Dr. Alvarado would recommend an EGD, however, this has already been scheduled at [facility].  Advised to call with any other questions or concerns.  STEPHANIE Molina RN

## 2023-04-11 NOTE — TELEPHONE ENCOUNTER
In reference to #2 below-  I received a VM message from MIKALA Echevarria at [facility], requesting hold and bridge orders.  They advise a 12 hr hold of Lovenox prior to EGD on 05/11/23.  Call [facility]ale back at [phone] to leave a detailed message.  STEPHANIE Molina RN

## 2023-04-12 NOTE — TELEPHONE ENCOUNTER
Order faxed to [facility] for EGD.  My Chart message sent to patient.  STEPHANIE Molina RN

## 2023-04-28 NOTE — TELEPHONE ENCOUNTER
[id number]  Kim Sparrow  67 year old female  [facility] Diagnosis: Recurrent blood clots  [facility]CD Provider: Cogan RN called [facility] to provide hold orders for upcoming colonoscopy/endoscopy. 12 hour hold of lovenox approved per Dr. Cogan.     Nuha Sher  MSN, RN, PHN  [facility]  Office: [phone]  Fax: [phone]

## 2023-05-08 NOTE — TELEPHONE ENCOUNTER
Patient Quality Outreach Health Maintenance - PAL RN    Summary:    PAL RN contacted pt regarding overdue health maintenance    Patient is due/failing the following:   Hypertension -  Microalbumin (Hypertensive Kidney Disease)  Colon Cancer Screening-Patient states that she has this scheduled on 05/11/23  Breast Cancer Screening - Mammogram  DEXA      Topic Date Due     Pneumococcal Vaccine (1 - PCV) Never done     Zoster (Shingles) Vaccine (1 of 2) Never done     COVID-19 Vaccine (4 - Booster for Pfizer series) 01/17/2022       Health Maintenance Due   Topic Date Due     MICROALBUMIN  Never done     ANNUAL REVIEW OF  ORDERS  Never done     Pneumococcal Vaccine: 65+ Years (1 - PCV) Never done     ZOSTER IMMUNIZATION (1 of 2) Never done     COVID-19 Vaccine (4 - Booster for Pfizer series) 01/17/2022     COLORECTAL CANCER SCREENING  02/09/2023     DEXA  04/07/2023     MAMMO SCREENING  05/20/2023       Type of outreach:    Phone, spoke to patient/parent. patient about the Colonoscopy.  I also sent My Chart message to inquire about the DEXA and Mammogram  Patient reports that her last mammogram and DEXA were done at [facility].   She plans to check with [facility] to see if they prefer she follow-up with these exams there, and then will get back to me.    - Advised patient if any questions or concerns comes up to call the PAL RN.   - Patient given opportunity to ask questions and at this time there is nothing further needed.     Questions for provider review:    None                                                                                     Chart routed to Provider to sign orders.    STEPHANIE Molina RN

## 2023-05-08 NOTE — TELEPHONE ENCOUNTER
"Nurse Triage SBAR    Is this a 2nd Level Triage? NO  Patient is also requesting a referral to [facility] for medication review.   States that she hasn't felt well since beginning the Lovenox, and would like a pharmacist to review medications with her.  Referral placed.    Situation:  Has had ongoing back pain for a \"long time\".   In fact states this pain began when she received chemotherapy years ago.  She notes that she has experienced flares of pain since this time.  The last couple of weeks she has noted significant pain in left posterior ribs, stating that the  location is \"under my bra strap\".   Occasionally will radiate to the right side.  Some days the pain is worse than others.   Today is feeling better.  Reports that sometimes she can control the pain with Tylenol and heat.  Other days, she may take a total of 4-500 mg Tylenol in a day, and an Oxycodone, with no pain relief.  She also has a \"pain cream\" that she applies that sometimes offers relief.  Is asking for a referral for Physical therapy or for a specialist to discuss pain control.  Patient states that there is no rash present.    Background: Patient completed radiation therapy through [facility] on 02/17/23.  Per visit note-metastatic lung adenocarcinoma involving C1 vertebrae as well as progressive sclerotic changes in T1 and T2 determined to also be metastatic. Her disease is stable elsewhere. She also underwent definitive chemoradiotherapy at an outside institution in Jan.-Feb. of 2019 with mild radiation overlap with her T1/T2 plan.     Patient also reports that historically she had a steroid injection in her back that she recalls didn't help to relieve the pain.    Assessment:   Pain possible related to metastatic lung cancer vs muscle strain.    Protocol Recommended Disposition:   Will treat at home    Recommendation:   Unsure if patient should see Physical therapy vs Spine vs pain management?   Order pended for Physical therapy.    Routed to " provider    Does the patient meet one of the following criteria for ADS visit consideration? 16+ years old, with an [facility] PCP     TIP  Providers, please consider if this condition is appropriate for management at one of our Acute and Diagnostic Services sites.     If patient is a good candidate, please use dotphrase <dot>triageresponse and select Refer to ADS to document.

## 2023-05-08 NOTE — PROGRESS NOTES
[facility]  [address] [place] [id number]  Phone: [phone], Fax: [phone]    Outpatient Visit Note:    Patient: Kim Sparrow  MRN: [id number]  : 1955  ERIK: May 9, 2023    Assessment:  Kim Sparrow is a 66 year old woman with metastatic lung adenocarcinoma and recurrent blood clots (including strokes despite apixaban), now on enoxaparin.    Her cancer is in remission, and she continues on mobocertinib therapy in addition to having completed radiation to a possible lesion in her spine.  She would like to be off of enoxaparin.  We had a long discussion about the risks and benefits of switching from this back to DOAC therapy, including a recurrent clot and possible bleeding issues.  The safest course of action would be to continue with enoxaparin, as this has been working for her for some time, and we can never be completely sure that her cancer is gone.  However, the apixaban was effective in treating her prior PE, and the strokes may or may not have been driven by the same underlying thrombophilia process.  Presumably both episodes were due to cancer, which is now in remission unlike when the strokes occurred.  Thus, it would not be unreasonable to trial a prophylactic dose DOAC to improve her quality of life while providing protection against additional clots.  If her cancer were to recur, or of course if she were to develop recurrent clots, we could reconsider changing to therapeutic DOAC or resuming Lovenox.  She is going to think things over and discuss with her , and then reach out to me with a decision.  We will follow-up in 3 months to reassess what ever we decide.     Plan:  -Continue Lovenox for now  -She will discuss a change to prophylactic dose apixaban with her   -Follow-up in 3 months    The patient is given our center's contact information and is instructed to call if she should have any further questions or  concerns.    Lani Kauffman, nurse clinician, is assigned to provide patient care coordination and education.     Patient understands and agrees with the above plan and recommendation.    Jacob Cogan, MD  Hematology    30 minutes spent on the date of the encounter doing chart review, history and exam, documentation and further activities per the note    ----------------------------------------------------------------------------------------------------------------------  History of Present Illness:  Kim Sparrow is a 66 year old woman with a history of metastatic lung adenocarcinoma (s/p XRT and multiple chemotherapies C/B bony mets, on mobocertinib), pulmonary embolism (judged to be unprovoked, 2020, was on apixaban), CKD, recent strokes now on Lovenox monitored by anti-Xa levels, referred to hematology for management of anticoagulation.    She had a brain MRI on October 21 to screen for brain mets.  MRI showed several acute/subacute infarcts in multiple vascular territories most prominent in the occipital lobe.  Then on October 22 she developed vision difficulties on the left which improved gradually.  Then on October 29 she had word finding difficulties and went to [place] ER.  Found to have new frontal and parietal infarcts.  CTA showed occlusion of the left MCA.  Not a candidate for thrombolytics or endovascular therapy.  Overall concerning for proximal embolic source.  IRVING not obtained due to esophageal stricture.  TTE with no evidence of thrombus.  Apixaban changed to Lovenox.    She is currently on Lovenox once daily, managed by the anticoagulation clinic.  Her anti-Xa levels have been high, leading to the once daily dosage currently.  She dislikes the shots but overall feels better.  Has occasional nosebleeds when blowing her nose in the morning, but otherwise no bleeding or bruising.    February 7, 2023: She continues on enoxaparin.  We received a call that she was seen at [facility] on January 30 and apparently was  told that she could be bleeding into her abdomen.  Her hemoglobin had dropped from normal to 9.8 g/dL.  She reported a feeling of a pit in her stomach and abdominal tenderness.  She has had some coughing/spitting up of bile.  There is a feeling of reflux.  She had a repeat hemoglobin earlier this week that was stable to improved.  At this appointment with [facility] she also had an MRI brain and PET/CT reviewed, without any clear evidence of disease recurrence or progression.  She also had a CT scan of her neck that showed a likely metastasis in the C1 vertebral body. She continues on her mobocertinib therapy. Will start SBRT soon - unclear if this represents a true recurrence.  She feels okay currently.  No active bleeding or bruising, though ongoing GI symptoms.  Would prefer a pill for anticoagulation, but is not overly distressed by the injections of Lovenox, which her  administers to her.  Would prefer the 0.4 mL syringes.    May 9, 2023: Hemoglobin most recently 10.5 g/dL.  Ferritin 277 at that time, iron saturation 16%. Takes iron gummies daily. Went on vacation for a month. Had radiation to spine, due to follow up with her oncologist.  She continues on mobocertinib.  Her current understanding is that her disease is in remission.  She would like to be off of Lovenox if possible.    Past Medical History:  Past Medical History:   Diagnosis Date     Diverticulitis 8/6/2014     Diverticulitis of colon 9/7/2012     Diverticulosis 12/1/08    mild, diagnosed on screening colonoscopy     Other and unspecified hyperlipidemia        Past Surgical History:  Past Surgical History:   Procedure Laterality Date     COLONOSCOPY  10/28/2014    Dr. Juarez [facility]     LAPAROSCOPIC ASSISTED COLECTOMY LEFT (DESCENDING) N/A 10/29/2014    Procedure: LAPAROSCOPIC ASSISTED COLECTOMY LEFT (DESCENDING);  Surgeon: Kim Juarez MD;  Location: [facility] OR     TUBAL LIGATION  1995     wisdom teeth extraction         Medications:  Current  Outpatient Medications   Medication Sig Dispense Refill     acetaminophen (TYLENOL) 500 MG tablet Take 500-1,000 mg by mouth every 6 hours as needed       diphenhydrAMINE-acetaminophen (TYLENOL PM)  MG tablet Take 1 tablet by mouth nightly as needed       diphenoxylate-atropine (LOMOTIL) 2.5-0.025 MG tablet Take 1 tablet by mouth 4 times daily as needed for diarrhea 100 tablet 1     enoxaparin ANTICOAGULANT (LOVENOX) 40 MG/0.4ML syringe Inject 0.4 mLs (40 mg) Subcutaneous daily 12 mL 2     EXKIVITY 40 MG capsule        levothyroxine (SYNTHROID/LEVOTHROID) 112 MCG tablet Take 1 tablet (112 mcg) by mouth daily 90 tablet 3     lisinopril (ZESTRIL) 10 MG tablet Take 10 mg by mouth daily       loperamide (IMODIUM A-D) 2 MG tablet Take 1-2 tablets (2-4 mg) by mouth 4 times daily as needed for diarrhea 180 tablet 3     LORazepam (ATIVAN) 0.5 MG tablet As need for anxiety every 6 hours.  Try one half tab. (Patient not taking: Reported on 11/28/2022)       ondansetron (ZOFRAN) 8 MG tablet Take 4 mg by mouth daily as needed Take daily when on mo       oxyCODONE (ROXICODONE) 5 MG tablet Take 1 tablet (5 mg) by mouth every 6 hours as needed for breakthrough pain 30 tablet 0     rosuvastatin (CRESTOR) 20 MG tablet Take 1 tablet (20 mg) by mouth daily 90 tablet 3     rosuvastatin (CRESTOR) 40 MG tablet Take 1 tablet (40 mg) by mouth daily 90 tablet 3     sodium chloride 1 GM tablet Take 0.5 tablets (0.5 g) by mouth 2 times daily 90 tablet 3        Allergies:  Allergies   Allergen Reactions     Carboplatin Itching     Itching in hands and feet.         ROS:  Remainder of a comprehensive 14 point ROS is negative unless noted above.    Social History:  Denies any tobacco use. No significant alcohol use. Denies any illicit drug use.     Family History:  Non-contributory to the current presentation    Objectives:  N/A, video visit    Labs:  WBC   Date Value Ref Range Status   08/07/2014 7.1 4.0 - 11.0 10e9/L Final   08/06/2014  11.0 4.0 - 11.0 10e9/L Final   07/07/2014 8.4 4.0 - 11.0 10e9/L Final   09/07/2012 8.3 4.0 - 11.0 10e9/L Final     WBC Count   Date Value Ref Range Status   05/04/2023 6.5 4.0 - 11.0 10e3/uL Final   02/02/2023 6.1 4.0 - 11.0 10e3/uL Final     Hemoglobin   Date Value Ref Range Status   05/04/2023 10.5 (L) 11.7 - 15.7 g/dL Final   02/02/2023 10.4 (L) 11.7 - 15.7 g/dL Final   10/28/2014 14.3 11.7 - 15.7 g/dL Final   08/07/2014 12.2 11.7 - 15.7 g/dL Final   08/06/2014 13.2 11.7 - 15.7 g/dL Final   07/07/2014 13.0 11.7 - 15.7 g/dL Final     Hematocrit   Date Value Ref Range Status   05/04/2023 31.5 (L) 35.0 - 47.0 % Final   02/02/2023 32.0 (L) 35.0 - 47.0 % Final   08/07/2014 35.8 35.0 - 47.0 % Final   08/06/2014 39.5 35.0 - 47.0 % Final   07/07/2014 37.9 35.0 - 47.0 % Final   09/07/2012 40.1 35.0 - 47.0 % Final     Platelet Count   Date Value Ref Range Status   05/04/2023 290 150 - 450 10e3/uL Final   02/02/2023 291 150 - 450 10e3/uL Final   11/01/2014 206 150 - 450 10e9/L Final   10/30/2014 196 150 - 450 10e9/L Final   08/07/2014 235 150 - 450 10e9/L Final   08/06/2014 265 150 - 450 10e9/L Final         Imaging:  Mammogram - HIM Scan  Mammo 10/15/14   [facility]  [place]     Video-Visit Details:  Type of service:  Video Visit  Video Start Time: 2:30 PM  Video End Time (time video stopped): 2:45  Originating Location (pt. Location): Home  Distant Location (provider location):  [facility]   Mode of Communication:  Video Conference via [place]

## 2023-05-08 NOTE — TELEPHONE ENCOUNTER
Patient did speak with the Oncology team at [facility].  Per patient-Dr. Eckert reviewed a prior scan and noted she has a degenerative issue in her back.  He recommends that PCP address this issue and make recommendations for a steroid injection vs Physical therapy.  Will forward to Dr. Alvarado for review.  STEPHAINE Molina RN

## 2023-05-08 NOTE — TELEPHONE ENCOUNTER
I spoke with patient and she will reach out to her Oncologist also.  She will send me an update thru My Chart once he responds to her.  Advised her that the referral was sent for MTM at [place] per her request.  STEPHANIE Molina RN

## 2023-05-08 NOTE — TELEPHONE ENCOUNTER
I think this is ok to wait for PCP to weigh in on. However, would encourage patient to address these concerns with her oncologist as I would worry that some of this may be related to her metastatic lung cancer and I'm not sure if further imaging would be warranted.    Fine to refer to MTM if patient prefers. Not sure if she would prefer this through [facility] vs here.    Kim Rivera MD  Internal Medicine-Pediatrics

## 2023-05-09 NOTE — TELEPHONE ENCOUNTER
physical therapy referral signed.    Jemma Alvarado MD  Internal Medicine & Pediatrics  [facility]  She/her

## 2023-05-09 NOTE — PROGRESS NOTES
Patient was contacted to complete the pre-visit call prior to their telephone visit with the provider.     Allergies and medications were reviewed.     I thanked them for their time to cover this information.     Heather Bird MA

## 2023-05-09 NOTE — TELEPHONE ENCOUNTER
My chart message sent to patient with recommendation to begin Physical therapy.  Also sent patient the phone number to contact them to set up an appointment, if she hasn't received a call from [facility].  Advised her to contact me with any other questions or concerns.  STEPHANIE Molina RN

## 2023-05-10 NOTE — TELEPHONE ENCOUNTER
1. Would try not to take more than 4 oxycodone per day but if pain increasing should have visit.    2. Should see spine provider (including Dr. Chicas in [place]) if interested in spine injection. Often they want a spine MRI before an injection, and often insurance won't cover an MRI unless patient has failed conservative measurement including physical therapy for 6 weeks - so I would try physical therapy first but could schedule spine appointment if she wants.  Spine referral signed.    3. Recommend recheck sodium in 2-3 weeks. Lab ordered.    Jemma Alvarado MD  Internal Medicine & Pediatrics  [facility]  She/her

## 2023-05-10 NOTE — TELEPHONE ENCOUNTER
Dr. Alvarado, please see #[id number].   Thank you.  Remainder of note is for RN documentation.    1.  Oxycodone 5 mg        Last written prescription date: 04/11/23        Last fill quantity: 30, # refills: 0        Last office visit: 03/02/23 an Evisit for symptom of diarrhea       Future office visit: None scheduled        Routing refill request to provider for review/approval because:  Drug not on the [facility] refill protocol   Patient takes two Tylenol 500 mg twice daily, and one Oxycodone daily.  Once in awhile has to take two tablets daily.  Last Sunday had to take three Oxycodone.  Patient is asking for Dr. Alvarado to advise, how many Oxycodone should she take per day?    2. Patient inquiring about steroid injection for back pain.    Should she try the Physical therapy first, and see if this offers relief before proceeding with an injection?  Patient notes that sometimes she has increased pain and is wondering if she should get scheduled for a steroid injection or have her try Physical therapy for a few sessions vs referral to pain clinic?  Consider Lidocaine patches?    3. Patient noted that last Sodium level on 05/04 was 132.     She has resumed taking Sodium 1 gm tablets and is taking 1/2 tablet twice daily.    4.  Patient had visit with Hematology yesterday and he didn't mention her low blood counts.  She is wondering who manages this?  Will advise patient that Hematology manages this.  Per virtual visit note of 05/09 with Hematologist-  May 9, 2023: Hemoglobin most recently 10.5 g/dL.  Ferritin 277 at that time, iron saturation 16%. Takes iron gummies daily.  Hemoglobin   Date Value Ref Range Status   05/04/2023 10.5 (L) 11.7 - 15.7 g/dL Final   02/02/2023 10.4 (L) 11.7 - 15.7 g/dL Final   10/28/2014 14.3 11.7 - 15.7 g/dL Final   08/07/2014 12.2 11.7 - 15.7 g/dL Final    0 Result Notes           Component Ref Range & Units 6 d ago 3 mo ago    Iron 37 - 145 ug/dL 48  32 Low      Iron Binding Capacity 240 - 430 ug/dL 293   303     Iron Sat Index 15 - 46 % 16  11 Low     Resulting Agency            Component Ref Range & Units 6 d ago 3 mo ago    Ferritin 11 - 328 ng/mL 277  219      Advised patient that the Hematologist would be the provider to discuss the anemia.    Advised her that he did make a note regarding this from the visit yesterday.    Patient states she has to call him back again and will ask him about this when she returns his call.    5. Regarding care coordination-patient doesn't have any community needs at this time.   We discussed that she may call me directly or send me a My Chart message with her medical questions, and patient is in agreement with this plan.  STEPHANIE Molina RN

## 2023-05-10 NOTE — PROGRESS NOTES
Medication Therapy Management (MTM) Encounter    ASSESSMENT:                            Medication Adherence/Access:   Per UTD: no major concerns with drug-drug interactions with exception of using caution with multiple medications that may have CNS depressant effects.    Risk of Adverse Effects for UTD search (rhinorrea, reflux, hair loss)  Enoxaparin - concerns with increase of bleed  Ferrous sulfate/iron -  Reports of heartburn range from 1 to 68% of patients. Some reports of respiratory/sinus symptoms with IV iron only.   Rosuvastatin - abdominal pain reports in 1-2.4% of patients  Exkivity - hair loss (19%), weight loss (21%), decreased appetite (39%), nausea (37%), vomiting (40%), dyspepsia/indegestion (11%), GERD (15%), rhinorrhea/runny nose (13%)     Non-Small Cell Lung Cancer/Hx PE: follows with Oncology (Dr. Zeeshan Eckert) & Hematology (Dr. Cogan). See potential side effects listed above for Exkivity. Patient may benefit from further discussion with Oncology at upcoming follow-up. MTM will suggest that patient considers taking this food to help reduce nausea if not currently administering this way. Per UTD: take around same time daily with or without food, administering with food may reduce nausea.     Hypertension: Patient is meeting blood pressure goal of < 130/80mmHg (per [facility] visit today).     Hyperlipidemia: Stable.     Chronic Pain: needs improvement. Scheduled to see PT tomorrow. Discussed she may take additional Tylenol 500 mg tablet at bedtime and max of 4000 mg daily (divided every 6 hours) per orders.     Acid Reflux: would benefit from upper endoscopy as scheduled. Until results of endoscopy are confirmed, may consider trial of H2 blocker (famotidine) 10 mg twice daily as needed for symptoms. See considerations above, worsened GI symptoms could be related to iron supplement and/or Exkivity. Would benefit from follow up as planned with GI and oncology for further discussion.      Hypothyroidism:  Last TSH is above normal range. However, last T4 within normal limits. Recently increased levothyroxine dose, consider recheck of Thyroid labs in 6-8 weeks.     Supplements/Anemia: sodium levels below normal range, would benefit from sodium recheck with next labs. MTM will suggest that patient tries taking iron supplement with water or juice if not currently administering this way (per UTD). Would benefit from further discussion with hematology or oncology on plans for continuation of iron supplement in the future.     PLAN:                            Chronic Pain: May take additional Tylenol 500 mg tablet at bedtime and max of 4000 mg daily (max of 1000 mg every 6 hours) per orders.     Acid Reflux: Consider trying famotidine (Pepcid) 10 mg tablet twice daily as needed for symptoms. Please follow up as planned with Gastroenterology.     Potential Side Effect Concerns:   See AVS from 5/11/23 for additional details.     Exkivity has greatest potential for symptoms patient is experiencing.     Informed patient this medication is important for their treatment & may consider discussing concerns with oncologist at upcoming follow-up     May consider taking with food     Iron supplement could also play a smaller role in some upset stomach/reflux.     Suggested patient consider taking this with water or juice & may ask oncologist or hematologist about plans for continuing     Follow-up: with Dr. Alvarado, Oncologist (Dr. Zeeshan Eckert) & Hematologist (Dr. Cogan). Return for Follow up, Medication Therapy Management annually or as needed.    SUBJECTIVE/OBJECTIVE:                          Kim Sparrow is a 67 year old female called for an initial visit. She was referred to me from Jemma Alvarado.      Reason for visit: concerns with possible side effects with some recent changes in her medications. Switched statin therapy recently. Other changes to medications include Lovenox. Reports some recent hair  breakage and runny nose. She has concerns with stomach pain/reflux when she eats (See below)     Patient requests a full review of drug databases for risks/potential for the symptoms she is experiencing     Allergies/ADRs: Reviewed in chart  Past Medical History: Reviewed in chart  Tobacco: She reports that she quit smoking about 41 years ago. Her smoking use included cigarettes. She has never used smokeless tobacco.  Alcohol: Less than 1 beverages / week  Caffeine: 1 cup of coffee per day  Activity: tries to get on treadmill, due to limited restriction this has been tough     Medication Adherence/Access:   Patient uses pill box(es). Patient never misses dose.   Medication barriers: none.     Non-Small Cell Lung Cancer/Hx PE:   Exkivity 40 mg capsule once daily - reports this does cause diarrhea  Reports diarrhea has been a chronic issue for her with oncology treatments. Has Lomotil and Loperamide on hand if needed for diarrhea. Reports she is not regularly requiring.    Zofran once daily as needed for nausea. No concerns with side effects.     Specialist: Dr. Zeeshan Eckert, [facility].  Last visit on 1/30/23. Next appt 5/23/23.     Specialist: Dr. Cogan, Hematology.  Last visit on 5/9/23.  Metastatic lung adenocarcinoma and recurrent blood clots (including strokes despite apixaban), now on enoxaparin. Cancer is in remission, and she continues on mobocertinib therapy in addition to having completed radiation to a possible lesion in her spine. Considering switching back to DOAC therapy due to current remission & QOL. Following plan was noted:   -Continue Lovenox for now  -She will discuss a change to prophylactic dose apixaban with her   -Follow-up in 3 months    Wt Readings from Last 5 Encounters:   01/23/23 117 lb 9.6 oz (53.3 kg)   11/28/22 123 lb (55.8 kg)   10/17/22 128 lb 9.6 oz (58.3 kg)   06/08/22 133 lb (60.3 kg)   02/04/19 154 lb (69.9 kg)     Estimated body mass index is 20.83 kg/m  as calculated  "from the following:    Height as of 1/23/23: 5' 3\" (1.6 m).    Weight as of 1/23/23: 117 lb 9.6 oz (53.3 kg).    Creatinine   Date Value Ref Range Status   01/23/2023 1.40 (H) 0.51 - 0.95 mg/dL Final   10/30/2014 0.74 0.52 - 1.04 mg/dL Final     CrCl cannot be calculated (Patient's most recent lab result is older than the maximum 30 days allowed.).    GFR Estimate   Date Value Ref Range Status   01/23/2023 41 (L) >60 mL/min/1.73m2 Final     Comment:     eGFR calculated using 2021 CKD-EPI equation.     Hypertension: Current medications include lisinopril 10 mg daily. Patient reports no current medication side effects.    Patient self-monitors blood pressure. Reports her home readings have been at goal. No specific readings available to report today.    Sent [employer] message following appt -  /75 mmHg at [facility] on 5/11/23. Added to pt reported vitals.   BP Readings from Last 3 Encounters:   04/03/23 (!) 140/78   02/14/23 (!) 144/84   01/23/23 130/80     Pulse Readings from Last 3 Encounters:   04/03/23 101   02/14/23 80   01/23/23 99     Potassium   Date Value Ref Range Status   01/23/2023 4.8 3.4 - 5.3 mmol/L Final   06/30/2022 4.6 3.4 - 5.3 mmol/L Final   08/07/2014 4.1 3.4 - 5.3 mmol/L Final      Chronic Pain:   Oxycodone 5 mg every 6 hrs as needed, tries to limit to once daily   Acetaminophen 1000 mg daily - twice daily   Tylenol PM 1 tablet nightly for sleep   Reports pain in her back is a current concern. She was referred & is scheduled to see PT tomorrow.   Patient reports no current medication side effects.    Hyperlipidemia: Current therapy includes rosuvastatin 20 mg daily.   Reports she was initially on atorvastatin then switched to atorvastatin 40 mg. Notes she requested this be reduced as she thought it may be causing her side effects. Patient reports no significant myalgias.  The ASCVD Risk score (Ramon DK, et al., 2019) failed to calculate for the following reasons:    The patient has a prior MI or " stroke diagnosis  Recent Labs   Lab Test 01/23/23  1338   CHOL 155   HDL 58   LDL 78   TRIG 96     Acid Reflux: Has tried Tums and Pepto bismol as needed with some relief. Reports symptoms of acid reflux/heartburn. Will happen on an empty stomach in the morning and with food. Also has some difficulties swallowing at times - feels like food is stuck in throat.  Denies blood in stool or urine     Has an appointment scheduled with [facility] for further evaluation & upper endoscopy. Completed colonoscopy today at [facility]    Hypothyroidism: Patient is taking Levothyroxine 122 mcg daily + 1 extra tables once weekly (increased with last lab). Patient is having the following symptoms: none. She separates iron & levothyroxine   TSH   Date Value Ref Range Status   05/04/2023 10.70 (H) 0.30 - 4.20 uIU/mL Final   11/07/2022 6.34 (H) 0.40 - 4.00 mU/L Final     TSH   Date Value Ref Range Status   05/04/2023 10.70 (H) 0.30 - 4.20 uIU/mL Final   11/07/2022 6.34 (H) 0.40 - 4.00 mU/L Final     Free T4   Date Value Ref Range Status   05/04/2023 1.46 0.90 - 1.70 ng/dL Final     Supplements/Anemia: Currently taking Sodium chloride 1 gram 0.25 g twice daily tablet for hyponatremia. No reported issues at this time.   Sodium   Date Value Ref Range Status   05/04/2023 132 (L) 136 - 145 mmol/L Final   08/07/2014 136 133 - 144 mmol/L Final     Takes Ferrous 65 elemental iron per 2 - once daily (added to med list). Side effects: none.   She was unable to tolerate iron injections  Ferritin   Date Value Ref Range Status   05/04/2023 277 11 - 328 ng/mL Final     Iron   Date Value Ref Range Status   05/04/2023 48 37 - 145 ug/dL Final     Iron Binding Capacity   Date Value Ref Range Status   05/04/2023 293 240 - 430 ug/dL Final     Hemoglobin   Date Value Ref Range Status   05/04/2023 10.5 (L) 11.7 - 15.7 g/dL Final   10/28/2014 14.3 11.7 - 15.7 g/dL Final     BP Readings from Last 1 Encounters:   04/03/23 (!) 140/78     Pulse Readings from Last 1  "Encounters:   04/03/23 101     Wt Readings from Last 1 Encounters:   01/23/23 117 lb 9.6 oz (53.3 kg)     Ht Readings from Last 1 Encounters:   01/23/23 5' 3\" (1.6 m)     Estimated body mass index is 20.83 kg/m  as calculated from the following:    Height as of 1/23/23: 5' 3\" (1.6 m).    Weight as of 1/23/23: 117 lb 9.6 oz (53.3 kg).    Temp Readings from Last 1 Encounters:   04/03/23 97.4  F (36.3  C)   ----------------      I spent 40 minutes with this patient today. All changes were made via verbal approval with Jemma Alvarado MD. A copy of the visit note was provided to the patient's provider(s).    A summary of these recommendations was given to the patient and was sent via [employer]    Nola Rosario PharmD  Medication Therapy Management Resident  Pager: [phone]     Kim Sparrow was seen independently by Dr. Nola Rosario. I have reviewed and agree with the resident note and plan of care.      Milly Ontiveros PharmD [facility]  Medication Therapy Management Provider  Pager #[phone]      Telemedicine Visit Details  Type of service:  Telephone visit  Start Time: 10:05am  End Time: 10:45am     Medication Therapy Recommendations  Adenocarcinoma of right lung (H)    Current Medication: EXKIVITY 40 MG capsule   Rationale: Undesirable effect - Adverse medication event - Safety   Recommendation: Change Administration Time - Consider taking Exkivity once daily with food. May consider taking iron supplement daily with water or juice.   Status: Patient Agreed - Adherence/Education         Heartburn    Rationale: Untreated condition - Needs additional medication therapy - Indication   Recommendation: Start Medication - famotidine 10 MG tablet - Consider famotidine (Pepcid) 10 mg tablet twice daily as needed for symptoms.   Status: Accepted - no CPA Needed         Other chronic pain    Current Medication: acetaminophen (TYLENOL) 500 MG tablet   Rationale: Does not understand instructions - Adherence - " Adherence   Recommendation: Increase Frequency - may take additional Tylenol 500 mg tablet at bedtime and max of 4000 mg daily (divided every 6 hours) per orders.   Status: Accepted - no CPA Needed

## 2023-05-11 NOTE — PATIENT INSTRUCTIONS
Recommendations from today's MTM visit:                                                    MTM (medication therapy management) is a service provided by a clinical pharmacist designed to help you get the most of out of your medicines.   Today we reviewed what your medicines are for, how to know if they are working, that your medicines are safe and how to make your medicine regimen as easy as possible.      Edwin Alvarez,     Chronic Pain: May take additional Tylenol 500 mg tablet at bedtime and max of 4000 mg daily (max of 1000 mg every 6 hours) per orders.     Acid Reflux: Consider trying famotidine (Pepcid) 10 mg tablet twice daily as needed for symptoms. Please follow up as planned with Gastroenterology.     I completed a review of potential side effects for the symptoms you described & recent medications (see below).     I recommend you complete the upper endoscopy as planned with gastroenterology.     Additionally, based on what I found - it seems Exkivity has the greatest potential risks associated with symptoms. However, this medication is important for your care, so I recommend you discuss any concerns with your oncologist at your upcoming follow-up.     The iron supplement could also play a smaller role in some upset stomach/acid reflux. You can consider taking this with water or juice. Consider asking your oncologist at your upcoming appointment about plans for continuing your iron supplement.     I looked up potential adverse effects for the following medication. However, although these are potential side effects listed, this does not confirm that this is what is causing your symptoms. Multiple factors could play a role in your hair loss/breakage, runny nose, and acid reflux/heartburn  Exkivity - many of the symptoms you are describing align with potential side effects of this medication include hair loss, weight loss, decreased appetite, acid reflux/GERD, and runny nose.   Iron supplement - some reports of  "heartburn, occurrence varies widely in reports.   Enoxaparin (Lovenox) & Rosuvastatin - no reports of your symptoms are listed side effects    Follow-up: with Dr. Alvarado, Oncologist (Dr. Zeeshan Eckert) & Hematologist (Dr. Cogan). Return for Follow up, Medication Therapy Management annually or as needed.    It was great speaking with you today.  I value your experience and would be very thankful for your time in providing feedback in our clinic survey. In the next few days, you may receive an email or text message from [employer] with a link to a survey related to your  clinical pharmacist.\"     To schedule another MTM appointment, please call the clinic directly or you may call the MTM scheduling line at [phone] or toll-free at [phone].     My Clinical Pharmacist's contact information:                                                      Please feel free to contact me with any questions or concerns you have.      Nola Rosario, PharmD  Medication Therapy Management Resident  Pager: [phone]     "

## 2023-05-15 NOTE — TELEPHONE ENCOUNTER
Please see My Chart message with questions regarding stomach issues and use of Iron.  Should question regarding Iron supplement be forwarded to Hematologist?  STEPHANIE Molina RN

## 2023-05-15 NOTE — TELEPHONE ENCOUNTER
Oxycodone 5 mg        Last written prescription date: 05/10/23        Last fill quantity: 30, # refills: 0        Last office visit: 02/06/23        Future office visit: None scheduled        Is this a controlled substance?  Yes      Routing refill request to provider for review/approval because: Drug not on the [facility] refill protocol or controlled substance  Per My Chart message of 05/07, patient was advised that she could take up to 4 Oxycodone daily.  My chart message sent to patient for an update.  STEPHANIE Molina RN

## 2023-05-15 NOTE — TELEPHONE ENCOUNTER
Patient responded that she has obtained some relief from back pain taking the Oxycodone with a Tylenol.  Physical therapy appointment was scheduled and cancelled on 05/12.  Has rescheduled at the end of May.  Is also asking if we have received results from EGD and Colonoscopy done last week.  No results in Epic.  Will inquire if patient had [facility] at [facility].  Forwarded to provider for review.  STEPHANIE Molina RN

## 2023-05-15 NOTE — TELEPHONE ENCOUNTER
Oxycodone prescription signed.    Could try taking iron every other day to see if that helps symptoms.    Hiatal hernia shouldn't be causing that significant of symptoms especially if it's a small hernia.      Jemma Alvarado MD  Internal Medicine & Pediatrics  [facility]  She/her         Rafat Avila  Phone [phone]   Fax [phone]      OFFICE VISIT: 2019    Gokul Awan- : 1972    Chief Complaint:Ed is a 55 y.o. male who is here for Skin Lesion     HPI  The patient presents today for evaluation of wound. He reports recurrent skin wounds since he was a child. \"I have only had to get one lanced. \"  The left upper leg wound has improved once it drained. HTN:  \"I have white coat syndrome. \"  He forgot his BP medication this morning. He typically takes enalapril 10 mg & Lopressor 50 mg daily (suppose to take BID). INSOMNIA:  \"I go to sleep but I can't stay asleep. \"  Reports increased back pain. He hasn't been able to get comfortable at night due to the pain. BACK PAIN:  He was scheduled for left L4-5 LLIF with posterior spinal fusion was scheduled on 2019. Patient cancelled the surgery. He never met Dr. Ugo Weeks prior to his surgery. He stopped the Plavix and he started having palpitations. \"It freaked me out and I cancelled the surgery. \"    TOBACCO ABUSE:  This has been decreasing. He is on Chantix. He is smoking approximately 10/day. He previously smoking 1.5-2 ppd.     height is 6' 2\" (1.88 m) and weight is 253 lb (114.8 kg). His temporal temperature is 97.2 °F (36.2 °C). His blood pressure is 188/119 (abnormal) and his pulse is 84. His oxygen saturation is 98%. Body mass index is 32.48 kg/m².     Results for orders placed or performed during the hospital encounter of 19   CBC Auto Differential   Result Value Ref Range    WBC 14.5 (H) 4.8 - 10.8 K/uL    RBC 4.88 4.70 - 6.10 M/uL    Hemoglobin 16.4 14.0 - 18.0 g/dL    Hematocrit 48.7 42.0 - 52.0 %    MCV 99.8 (H) 80.0 - 94.0 fL    MCH 33.6 (H) 27.0 - 31.0 pg    MCHC 33.7 33.0 - 37.0 g/dL    RDW 13.0 11.5 - 14.5 %    Platelets 358 288 - 726 K/uL    MPV 9.4 9.4 - 12.4 fL    Neutrophils % 69.7 (H) 50.0 - 65.0 %    Lymphocytes % 19.8 (L) 20.0 - 40.0 %    Monocytes % 6.1 0.0 - 10.0 % Eosinophils % 2.6 0.0 - 5.0 %    Basophils % 0.7 0.0 - 1.0 %    Neutrophils # 10.1 (H) 1.5 - 7.5 K/uL    Lymphocytes # 2.9 1.1 - 4.5 K/uL    Monocytes # 0.90 0.00 - 0.90 K/uL    Eosinophils # 0.40 0.00 - 0.60 K/uL    Basophils # 0.10 0.00 - 0.20 K/uL   Comprehensive Metabolic Panel   Result Value Ref Range    Sodium 142 136 - 145 mmol/L    Potassium 4.0 3.5 - 5.0 mmol/L    Chloride 101 98 - 111 mmol/L    CO2 22 22 - 29 mmol/L    Anion Gap 19 7 - 19 mmol/L    Glucose 164 (H) 74 - 109 mg/dL    BUN 6 6 - 20 mg/dL    CREATININE 1.0 0.5 - 1.2 mg/dL    GFR Non-African American >60 >60    Calcium 9.4 8.6 - 10.0 mg/dL    Total Protein 7.8 6.6 - 8.7 g/dL    Alb 4.6 3.5 - 5.2 g/dL    Total Bilirubin 0.6 0.2 - 1.2 mg/dL    Alkaline Phosphatase 56 40 - 130 U/L    ALT 26 5 - 41 U/L    AST 22 5 - 40 U/L   Protime-INR   Result Value Ref Range    Protime 12.8 12.0 - 14.6 sec    INR 1.02 0.88 - 1.18   APTT   Result Value Ref Range    aPTT 26.0 26.0 - 36.2 sec   Urinalysis Reflex to Culture   Result Value Ref Range    Color, UA YELLOW Straw/Yellow    Clarity, UA Clear Clear    Glucose, Ur Negative Negative mg/dL    Bilirubin Urine Negative Negative    Ketones, Urine Negative Negative mg/dL    Specific Gravity, UA 1.011 1.005 - 1.030    Blood, Urine Negative Negative    pH, UA 6.0 5.0 - 8.0    Protein, UA Negative Negative mg/dL    Urobilinogen, Urine 0.2 <2.0 E.U./dL    Nitrite, Urine Negative Negative    Leukocyte Esterase, Urine Negative Negative    Urine Reflex to Culture Not Indicated    EKG 12 Lead   Result Value Ref Range    P-R Interval 134 ms    QRS Duration 94 ms    Q-T Interval 368 ms    QTc Calculation (Bazett) 396 ms    P Axis 44 degrees    T Axis 42 degrees     have reviewed the following with the Mr.  [address] Outpatient Visit on 07/26/2019   Component Date Value    WBC 07/26/2019 14.5*    RBC 07/26/2019 4.88     Hemoglobin 07/26/2019 16.4     Hematocrit 07/26/2019 48.7     MCV 07/26/2019 99.8* Start date: 200    Smokeless tobacco: Never Used    Tobacco comment: actively quitting with chantix   Substance Use Topics    Alcohol use: Yes     Comment: Rarely       Family History   Problem Relation Age of Onset    Coronary Art Dis Father     Diabetes Father     High Blood Pressure Father        Review of Systems   Constitutional: Negative for chills, fatigue and fever. HENT: Negative for congestion, ear pain, rhinorrhea and sore throat. Eyes: Negative for redness. Respiratory: Negative for cough and shortness of breath. Cardiovascular: Negative for chest pain. Gastrointestinal: Negative for abdominal pain, constipation, diarrhea and vomiting. Musculoskeletal: Positive for arthralgias and back pain. Skin: Positive for wound (healing left upper leg wound). Negative for rash. Neurological: Negative for dizziness and headaches. Psychiatric/Behavioral: Positive for sleep disturbance. Physical Exam   Constitutional: He appears well-developed. Overweight   HENT:   Head: Normocephalic. Right Ear: Tympanic membrane and external ear normal.   Left Ear: Tympanic membrane and external ear normal.   Nose: Nose normal.   Mouth/Throat: Oropharynx is clear and moist.   Eyes: Right eye exhibits no discharge. Left eye exhibits no discharge. Neck: Normal range of motion. Carotid bruit is not present. Cardiovascular: Normal rate and regular rhythm. Pulmonary/Chest: Effort normal and breath sounds normal. No stridor. No respiratory distress. He has no wheezes. He has no rales. Abdominal: Soft. Bowel sounds are normal.   Musculoskeletal:        Lumbar back: He exhibits tenderness and pain. Lymphadenopathy:     He has no cervical adenopathy. Neurological: He is alert. Skin: Skin is dry. Psychiatric: He has a normal mood and affect. His behavior is normal. Judgment and thought content normal.   Vitals reviewed. ASSESSMENT      ICD-10-CM    1.  Essential hypertension I10 metoprolol could make it hard to stand up.)     · You lose control of your bladder or bowels.    Watch closely for changes in your health, and be sure to contact your doctor if:    · You have a fever, lose weight, or don't feel well.     · You do not get better as expected. Where can you learn more? Go to [email or web address] and sign in to your [employer] account. Enter K959 in the [place] [employer] box to learn more about \"Back Pain: Care Instructions. \"     If you do not have an account, please click on the \"Sign Up Now\" link. Current as of: September 20, 2018  Content Version: 12.1  © 4989-7007 Healthwise, [employer]. Care instructions adapted under license by [facility] ([facility]). If you have questions about a medical condition or this instruction, always ask your healthcare professional. [place] [address] any warranty or liability for your use of this information. Controlled Substances Monitoring: Attestation: The Prescription Monitoring Report for this patient was reviewed today. ([id number]) [facility], APRN)            Additional Instructions: As always, patient is advised to bring in medication bottles in order to correctly reconcile with our current list.    Ed received counseling on the following healthy behaviors: n/a    Patient given educational materials on dx    I have instructed Ed to complete a self tracking handout on n/a and instructed them to bring it with them to his next appointment. Discussed use, benefit, and side effects of prescribed medications. Barriers to medication compliance addressed. All patient questions answered. Pt voiced understanding.      [facility], APRN

## 2023-05-15 NOTE — TELEPHONE ENCOUNTER
My Chart message sent to patient with this information.  Advised to contact us with any other questions.  STEPHANIE Molina RN

## 2023-05-16 NOTE — TELEPHONE ENCOUNTER
We have not received the Colonoscopy report.  I LM with the medical record dept at [facility] requesting they fax the Colonoscopy report.  STEPHANIE Molina RN

## 2023-05-16 NOTE — TELEPHONE ENCOUNTER
I have called for results to be faxed to us from [facility].    1.  Patient has attached a copy of the biopsy letter in My Chart message-negative for H Pylori.  Pepcid was recommended by [facility] to treat GI symptoms.  Patient is asking if she should try this, or just try decreasing the Iron to every other day first?  In message, she stated that she didn't get any guidance from [facility] on how to treat the bloating.  Per MTM visit note of 05/11-  Acid Reflux: would benefit from upper endoscopy as scheduled. Until results of endoscopy are confirmed, may consider trial of H2 blocker (famotidine) 10 mg twice daily as needed for symptoms. See considerations above, worsened GI symptoms could be related to iron supplement and/or Exkivity. Would benefit from follow up as planned with GI and oncology for further discussion.     2.  FIT test was positive.   Patient is asking what would have caused this in EGD was negative.   Records from [facility] have been requested.  Per result note-It does look like you have blood in the stool, which may indicate a slow bleed from somewhere in your intestine.  However I agree with the hematologist that if it is a bleed it is very slow and your hemoglobin is overall stable.  If the hemoglobin does not improve with the IV iron treatments, I recommend seeing a gastroenterology doctor at [facility] to discuss the safest way to have an upper endoscopy and colonoscopy.  Hemoglobin   Date Value Ref Range Status   05/04/2023 10.5 (L) 11.7 - 15.7 g/dL Final   02/02/2023 10.4 (L) 11.7 - 15.7 g/dL Final   10/28/2014 14.3 11.7 - 15.7 g/dL Final   08/07/2014 12.2 11.7 - 15.7 g/dL Final     My Chart message sent to patient to inquire if they received any results of Colonoscopy.  I also contacted  [facility] and spoke with Barbara and asked her to fax the report to us.  Will forward message to provider for review.  STEPHANIE Molina RN

## 2023-05-17 NOTE — TELEPHONE ENCOUNTER
Colonoscopy report received and placed at Dr. Alvarado's desk for review.  Report indicates internal hemorrhoid in anal canal.   (possibly the cause for the positive FIT test?)  Fixed tortuosity and angulation throughout the sigmoid/descending, suspect adhesion-related from prior surgeries.  Remainder of the exam is normal.  Repeat Colonosocpy in 5 years fr polyp surveillance.  Forwarded to provider for review.  STEPHANIE Molina RN

## 2023-05-21 NOTE — TELEPHONE ENCOUNTER
Reason for Disposition    [1] Caller has URGENT medicine question about med that PCP or specialist prescribed AND [2] triager unable to answer question    Additional Information    Negative: [1] Intentional drug overdose AND [2] suicidal thoughts or ideas    Negative: Drug overdose and triager unable to answer question    Negative: Caller requesting a renewal or refill of a medicine patient is currently taking    Negative: Caller requesting information unrelated to medicine    Negative: Caller requesting information about COVID-19 Vaccine    Negative: Caller requesting information about Emergency Contraception    Negative: Caller requesting information about Combined Birth Control Pills    Negative: Caller requesting information about Progestin Birth Control Pills    Negative: Caller requesting information about Post-Op pain or medicines    Negative: Caller requesting a prescription antibiotic (such as Penicillin) for Strep throat and has a positive culture result    Negative: Caller requesting a prescription anti-viral med (such as Tamiflu) and has influenza (flu) symptoms    Negative: Immunization reaction suspected    Negative: Rash while taking a medicine or within 3 days of stopping it    Negative: [1] Asthma and [2] having symptoms of asthma (cough, wheezing, etc.)    Negative: [1] Symptom of illness (e.g., headache, abdominal pain, earache, vomiting) AND [2] more than mild    Negative: Breastfeeding questions about mother's medicines and diet    Negative: MORE THAN A DOUBLE DOSE of a prescription or over-the-counter (OTC) drug    Negative: [1] DOUBLE DOSE (an extra dose or lesser amount) of prescription drug AND [2] any symptoms (e.g., dizziness, nausea, pain, sleepiness)    Negative: [1] DOUBLE DOSE (an extra dose or lesser amount) of over-the-counter (OTC) drug AND [2] any symptoms (e.g., dizziness, nausea, pain, sleepiness)    Negative: Took another person's prescription drug    Negative: [1] DOUBLE DOSE  (an extra dose or lesser amount) of prescription drug AND [2] NO symptoms (Exception: a double dose of antibiotics)    Negative: Diabetes drug error or overdose (e.g., took wrong type of insulin or took extra dose)    Negative: [1] Prescription not at pharmacy AND [2] was prescribed by PCP recently (Exception: triager has access to EMR and prescription is recorded there. Go to Home Care and confirm for pharmacy.)    Negative: [1] Pharmacy calling with prescription question AND [2] triager unable to answer question    Protocols used: MEDICATION QUESTION CALL-A-AH

## 2023-05-23 NOTE — TELEPHONE ENCOUNTER
Patient's Lisinopril is not prescribed by one of our providers. Please reach out to patient.    Thank you,    MIKALA Morelos on 5/23/2023 at 1:02 PM

## 2023-05-23 NOTE — TELEPHONE ENCOUNTER
Lisinopril 10 mg has been entered as historical. Please review & sign if appropriate. Thanks.     lisinopril (ZESTRIL) 10 MG tablet     --   Sig - Route: Take 10 mg by mouth daily - Oral   Class: Historical     BP Readings from Last 4 Encounters:   04/03/23 (!) 140/78   02/14/23 (!) 144/84   01/23/23 130/80   11/28/22 120/60     MIKALA Schmidt  Patient Advocate Liason (PAL)  [facility]  Ph. [phone] / Fax. [phone]

## 2023-05-24 NOTE — TELEPHONE ENCOUNTER
Agree this is likely the cause of positive FIT.    Jemma Alvarado MD  Internal Medicine & Pediatrics  [facility] [place]  She/her

## 2023-06-01 NOTE — TELEPHONE ENCOUNTER
Routing refill request to provider for review/approval because:  Drug not on the [facility] refill protocol     Danae HER RN   [facility]

## 2023-06-12 NOTE — TELEPHONE ENCOUNTER
I generally recommend Miralax (polyethylene glycol)/senna but could do lactulose 10-20 gm daily - order pended. Would probably do one or the other, not both.    Would also recommend patient inquire with her oncology team about meeting with a pain/palliative specialist through [facility]. I wonder if longer acting oxycontin would be beneficial.    Jemma Alvarado MD  Internal Medicine & Pediatrics  [facility] [place]  She/her

## 2023-06-12 NOTE — TELEPHONE ENCOUNTER
Patient has increased the dosing of the Oxycodone, and is now having constipation.  She states that in 2019, she had an RX for Lactulose that worked after using once, and is wondering about a new RX for this?  Did use Miralax for two days, and a suppository on the second day with results.  Also took Senna yesterday.  I checked medication history and don't see an order for Lactulose (may have been ordered thru [place])    Would you recommend Lactulose or a regimen of Miralax 1 capful daily with Senna/Docusate daily, if needed.  Please advise.  Thank you.  STEPHANIE Molina RN

## 2023-06-12 NOTE — TELEPHONE ENCOUNTER
My Chart message sent to patient with these recommendations.  Will await her response.  STEPHANIE Molina RN

## 2023-06-13 NOTE — TELEPHONE ENCOUNTER
Discussed with Dr. Alvarado-  Patient may take one capful of Miralax daily  She should use either the Lactulose OR the Senna, but not both.  Would recommend the Lactulose at this time as this may be more effective in treating the constipation.  Once she is having bowel movements, she may use the Lactulose as needed.  RX sent to the pharmacy per direction of Dr. Alvarado.  My Chart message sent to the patient with the recommendations noted above.  STEPHANIE Molina RN

## 2023-06-16 NOTE — TELEPHONE ENCOUNTER
[id number]  Kim Sparrow  67 year old female  CBCD Diagnosis: metastatic lung adenocarcinoma and recurrent blood clots (including strokes despite apixaban)  CBCD Provider: Dr. Cogan    Incoming call from Kim's  Osmani Sparrow. He states that patient had a paracentesis this morning. She held her Lovenox for 24 hours prior and is wondering when she should restart. Per Kvng Haro PA-C, if she takes Lovenox in the morning usually start tomorrow AM (6/17/23) and if she takes Lovenox in the evening usually start this evening.     RN called Kim back. She takes her Lovenox in the morning, RN provided instruction on taking her next dose of Lovenox tomorrow AM (6/17/23). Kim also states that she might have a PleurX catheter placed in the abdomen scheduled for next week. Kim is not sure if it is happening as it was removed from her schedule. She will call clinic back if it is indeed scheduled.    Tasneem Ruth RN, BSN, PCCN  Nurse Clinician    [facility]  [address] [place] [address], [address], [place] [address]   Office, direct: [phone]  Main office number: [phone]  Pronouns: She, her, hers    Addendum:    MycInbiomotiont message received with labs from [place] showing an INR of 9.8 and PT of 112. Reviewed with Dr. Cogan.    Tracy should hold her Lovenox. She should contact her oncologist to discuss her INR and PT from today and discuss if imaging of her liver is needed. Tracy will get these labs repeated Tuesday morning. Staff will reconnect with Tracy on Tuesday to discuss lab results and input from her oncologist.    Call placed to Tracy to discuss this. Tracy verbalized understanding and denies additional questions at this time.     Lani MALDONADON, RN, PHN   [facility]   Office: [phone]  Fax: [phone]

## 2023-06-19 NOTE — TELEPHONE ENCOUNTER
[id number]  Kim Sparrow  67 year old female  CBCD Diagnosis: recurrent blood clots   CBCD Provider: Dr. Cogan    Call placed to Kim to discuss getting labs drawn today or tomorrow. Kim requests that they be drawn tomorrow at her [place] appt at 730am. Lab orders faxed to [facility] at #[phone]    Tracy denies additional questions at this time. She understands to continue to hold her Lovenox until given guidance by Dr. Cogan. We will await those labs to make that determination of next steps.    Lani MALDONADON, RN, PHN   [facility]   Office: [phone]  Fax: [phone]

## 2023-07-14 NOTE — TELEPHONE ENCOUNTER
Pt is requesting refill on oxycodone. Last VV with  was on 2/6/23. Last AWV was on 1/23/23.    Pended med, please review & sign if appropriate. Thanks.      oxyCODONE (ROXICODONE) 5 MG tablet 90 tablet 0 6/1/2023  No   Sig - Route: Take 1 tablet (5 mg) by mouth every 6 hours as needed for breakthrough pain - Oral     Roxana RN  Patient Advocate Liaelian (PAL)  [facility]  Ph. [phone] / Fax. [phone]

## 2023-07-18 NOTE — TELEPHONE ENCOUNTER
Patient notified that RX was sent to the pharmacy on 07/14, and she should contact the pharmacy for this refill.  STEPHANIE Molina RN

## 2023-07-21 NOTE — TELEPHONE ENCOUNTER
Sent  message as per 's advise. Discontinued lisinopril from med list.    lisinopril (ZESTRIL) 10 MG tablet 90 tablet 1 5/23/2023  No   Sig - Route: Take 1 tablet (10 mg) by mouth daily - Oral     Roxana RN  Patient Advocate Liason (PAL)  [facility]  Ph. [phone] / Fax. [phone]

## 2023-07-21 NOTE — TELEPHONE ENCOUNTER
Yes recommend stopping lisinopril.    Jemma Alvarado MD  Internal Medicine & Pediatrics  [facility]  She/her

## 2023-07-21 NOTE — TELEPHONE ENCOUNTER
- please review the  message & advise.    MIKALA Schmidt  Patient Advocate Liason (PAL)  [facility]  Ph. [phone] / Fax. [phone]

## 2023-07-24 NOTE — TELEPHONE ENCOUNTER
Statin dose does not necessarily need to change based on weight change.  If patient having side effects of medication like muscle ache could reduce to 10mg daily or take every other day.    Jemma Alvarado MD  Internal Medicine & Pediatrics  [facility]  She/her

## 2023-07-24 NOTE — TELEPHONE ENCOUNTER
Please review pt's  message & advise. Last lipids was done in 1/23/23. Pt is currently taking crestor 20 mg daily. Looks like pt lost about 20# in 6 months. Had a VV with you on 2/6/23. LOV was on 1/23/23.      - Would you like a f/u appointment at this time?     FYI - We just discontinued her lisinopril( 7/21) because of her home BP readings were low.      11/28/2022  1:22 PM 1/23/2023  10:30 AM   Vital Signs     Weight (LB) 123 lb  117 lb 9.6 oz      MIKALA Schmidt  Patient Advocate Liason (PAL)  [facility]  Ph. [phone] / Fax. [phone]

## 2023-07-24 NOTE — TELEPHONE ENCOUNTER
Sent MC message to pt with 's clarification.    Roxana RN  Patient Advocate Liason (PAL)  [facility]  Ph. [phone] / Fax. [phone]

## 2023-07-25 NOTE — TELEPHONE ENCOUNTER
Pt's MC message is noted & sent clarification.    MIKALA Schmidt  Patient Advocate Liason (PAL)  [facility]  Ph. [phone] / Fax. [phone]

## 2023-08-21 NOTE — PROGRESS NOTES
[facility]  [address] [place] [id number]  Phone: [phone], Fax: [phone]    Outpatient Visit Note:    Patient: Kim Sparrow  MRN: [id number]  : 1955  ERIK: Aug 22, 2023    Assessment:  Kim Sparrow is a 66 year old woman with metastatic lung adenocarcinoma and recurrent blood clots (including strokes despite apixaban), now on enoxaparin.    She has ongoing metastatic cancer requiring chemotherapy and currently with malignant ascites, therefore still at risk of recurrence of thromboembolism.  Her initial strokes appear to have occurred despite apixaban therapy, which is what prompted the change to enoxaparin.  She has been on prophylactic dose enoxaparin without recurrence since 2022.  The injections have become painful tiresome to her, and she has long expressed a desire to change.  I expressed that there will always be a concern of recurrence if we change away from what has been working to another agent, but I also can appreciate the desire to improve her day-to-day quality of life while taking on some additional risk of recurrence.  We discussed that I would recommend starting rivaroxaban 10 mg daily with food.  We will need to keep an eye on her renal function as she is currently borderline for this medication, though with her GFR still in the range of what is acceptable.  I think this is a reasonable attempt to try to improve her quality of life, and we are in agreement about trying it.     Plan:  -Stop enoxaparin  -Start rivaroxaban 10 mg daily with food  -Follow-up in 3 months, monitor renal function    The patient is given our center's contact information and is instructed to call if she should have any further questions or concerns.    Patient understands and agrees with the above plan and recommendation.    Jacob Cogan, MD  Hematology    30 minutes spent on the date of the encounter doing chart review, history and exam,  documentation and further activities per the note    ----------------------------------------------------------------------------------------------------------------------  History of Present Illness:  Kim Sparrow is a 66 year old woman with a history of metastatic lung adenocarcinoma (s/p XRT and multiple chemotherapies C/B bony mets, on mobocertinib), pulmonary embolism (judged to be unprovoked, 2020, was on apixaban), CKD, recent strokes now on Lovenox monitored by anti-Xa levels, referred to hematology for management of anticoagulation.    She had a brain MRI on October 21 to screen for brain mets.  MRI showed several acute/subacute infarcts in multiple vascular territories most prominent in the occipital lobe.  Then on October 22 she developed vision difficulties on the left which improved gradually.  Then on October 29 she had word finding difficulties and went to [facility].  Found to have new frontal and parietal infarcts.  CTA showed occlusion of the left MCA.  Not a candidate for thrombolytics or endovascular therapy.  Overall concerning for proximal embolic source.  IRVING not obtained due to esophageal stricture.  TTE with no evidence of thrombus.  Apixaban changed to Lovenox.    She is currently on Lovenox once daily, managed by the anticoagulation clinic.  Her anti-Xa levels have been high, leading to the once daily dosage currently.  She dislikes the shots but overall feels better.  Has occasional nosebleeds when blowing her nose in the morning, but otherwise no bleeding or bruising.    February 7, 2023: She continues on enoxaparin.  We received a call that she was seen at [facility] on January 30 and apparently was told that she could be bleeding into her abdomen.  Her hemoglobin had dropped from normal to 9.8 g/dL.  She reported a feeling of a pit in her stomach and abdominal tenderness.  She has had some coughing/spitting up of bile.  There is a feeling of reflux.  She had a repeat hemoglobin earlier this  week that was stable to improved.  At this appointment with [place] she also had an MRI brain and PET/CT reviewed, without any clear evidence of disease recurrence or progression.  She also had a CT scan of her neck that showed a likely metastasis in the C1 vertebral body. She continues on her mobocertinib therapy. Will start SBRT soon - unclear if this represents a true recurrence.  She feels okay currently.  No active bleeding or bruising, though ongoing GI symptoms.  Would prefer a pill for anticoagulation, but is not overly distressed by the injections of Lovenox, which her  administers to her.  Would prefer the 0.4 mL syringes.    May 9, 2023: Hemoglobin most recently 10.5 g/dL.  Ferritin 277 at that time, iron saturation 16%. Takes iron gummies daily. Went on vacation for a month. Had radiation to spine, due to follow up with her oncologist.  She continues on mobocertinib.  Her current understanding is that her disease is in remission.  She would like to be off of Lovenox if possible.    August 22, 2023: Found to have malignant ascites in May.  Currently receiving single agent treatment with gemcitabine.  Due for staging scans next week after 3 cycles.  Currently has a Pleurx catheter in place for her ascites. Continues on enoxaparin prophylaxis.  No issues with bleeding or bruising, but notes ongoing discomfort with injections and an ongoing preference to change to an oral medication.  Also notes some upper GI symptoms that she is concerned are related to the Lovenox.    Past Medical History:  Past Medical History:   Diagnosis Date    Diverticulitis 8/6/2014    Diverticulitis of colon 9/7/2012    Diverticulosis 12/1/08    mild, diagnosed on screening colonoscopy    Other and unspecified hyperlipidemia        Past Surgical History:  Past Surgical History:   Procedure Laterality Date    COLONOSCOPY  10/28/2014    Dr. Larry RIDDLE    IR THORACENTESIS  3/3/2020    IR THORACENTESIS  2/20/2020    LAPAROSCOPIC  ASSISTED COLECTOMY LEFT (DESCENDING) N/A 10/29/2014    Procedure: LAPAROSCOPIC ASSISTED COLECTOMY LEFT (DESCENDING);  Surgeon: Kim Juarez MD;  Location: [facility] OR    TUBAL LIGATION  1995    wisdom teeth extraction         Medications:  Current Outpatient Medications   Medication Sig Dispense Refill    acetaminophen (TYLENOL) 500 MG tablet Take 500-1,000 mg by mouth every 6 hours as needed      apixaban ANTICOAGULANT (ELIQUIS) 2.5 MG tablet Take 1 tablet (2.5 mg) by mouth 2 times daily 60 tablet 2    diphenhydrAMINE-acetaminophen (TYLENOL PM)  MG tablet Take 1 tablet by mouth nightly as needed      enoxaparin ANTICOAGULANT (LOVENOX) 40 MG/0.4ML syringe Inject 0.4 mLs (40 mg) Subcutaneous daily 12 mL 2    EXKIVITY 40 MG capsule Take 80 mg by mouth daily      Ferrous Sulfate (IRON SUPPLEMENT PO) Takes 2 chewable gummies (65 Fe) once daily.      lactulose (CEPHULAC) 10 GM packet Take 1 packet (10 g) by mouth daily 30 each 3    levothyroxine (SYNTHROID/LEVOTHROID) 112 MCG tablet Take 1 tablet (112 mcg) by mouth daily 90 tablet 3    ondansetron (ZOFRAN) 8 MG tablet Take 4 mg by mouth daily as needed Take daily when on mo      oxyCODONE (ROXICODONE) 5 MG tablet Take 1 tablet (5 mg) by mouth every 6 hours as needed for breakthrough pain 90 tablet 0    rosuvastatin (CRESTOR) 20 MG tablet Take 1 tablet (20 mg) by mouth daily 90 tablet 3    sodium chloride 1 GM tablet Take 0.5 tablets (0.5 g) by mouth 2 times daily (Patient taking differently: Take 0.25 g by mouth 2 times daily) 90 tablet 3        Allergies:  Allergies   Allergen Reactions    Carboplatin Itching     Itching in hands and feet.         ROS:  Remainder of a comprehensive 14 point ROS is negative unless noted above.    Social History:  Denies any tobacco use. No significant alcohol use. Denies any illicit drug use.     Family History:  Non-contributory to the current presentation    Objectives:  N/A, video visit    Labs:  WBC   Date Value Ref Range  Status   08/07/2014 7.1 4.0 - 11.0 10e9/L Final   08/06/2014 11.0 4.0 - 11.0 10e9/L Final   07/07/2014 8.4 4.0 - 11.0 10e9/L Final   09/07/2012 8.3 4.0 - 11.0 10e9/L Final     WBC Count   Date Value Ref Range Status   06/13/2023 6.6 4.0 - 11.0 10e3/uL Final   05/04/2023 6.5 4.0 - 11.0 10e3/uL Final   02/02/2023 6.1 4.0 - 11.0 10e3/uL Final     Hemoglobin   Date Value Ref Range Status   06/13/2023 10.9 (L) 11.7 - 15.7 g/dL Final   05/04/2023 10.5 (L) 11.7 - 15.7 g/dL Final   02/02/2023 10.4 (L) 11.7 - 15.7 g/dL Final   10/28/2014 14.3 11.7 - 15.7 g/dL Final   08/07/2014 12.2 11.7 - 15.7 g/dL Final   08/06/2014 13.2 11.7 - 15.7 g/dL Final   07/07/2014 13.0 11.7 - 15.7 g/dL Final     Hematocrit   Date Value Ref Range Status   06/13/2023 33.2 (L) 35.0 - 47.0 % Final   05/04/2023 31.5 (L) 35.0 - 47.0 % Final   02/02/2023 32.0 (L) 35.0 - 47.0 % Final   08/07/2014 35.8 35.0 - 47.0 % Final   08/06/2014 39.5 35.0 - 47.0 % Final   07/07/2014 37.9 35.0 - 47.0 % Final   09/07/2012 40.1 35.0 - 47.0 % Final     Platelet Count   Date Value Ref Range Status   06/13/2023 377 150 - 450 10e3/uL Final   05/04/2023 290 150 - 450 10e3/uL Final   02/02/2023 291 150 - 450 10e3/uL Final   11/01/2014 206 150 - 450 10e9/L Final   10/30/2014 196 150 - 450 10e9/L Final   08/07/2014 235 150 - 450 10e9/L Final   08/06/2014 265 150 - 450 10e9/L Final         Imaging:  Mammogram - HIM Scan  Mammo 10/15/14   [facility]  [place]       Video-Visit Details:  Type of service:  Video Visit  Video Start Time: 11:06 AM  Video End Time (time video stopped): 11:15  Originating Location (pt. Location): Home  Distant Location (provider location):  [facility]   Mode of Communication:  Video Conference via [employer]

## 2023-08-25 NOTE — PROGRESS NOTES
Medication/Dose: XARELTO 10MG  Cover My Meds Key: [id number]    If notification received from pharmacy:   Pharmacy name: [email or web address]   Pharmacy Phone: [phone]  Insurance name: [employer]    PA approved.   Effective dates: 07- to 08-  Case #: [id number]

## 2023-09-26 ENCOUNTER — TELEPHONE (OUTPATIENT)
Dept: PEDIATRICS | Facility: CLINIC | Age: 68
End: 2023-09-26
Payer: MEDICARE

## 2023-09-26 NOTE — TELEPHONE ENCOUNTER
Please see My chart message to Hematology on 08/22.  Per report from daughter-   My mom passed away yesterday (Aug 30). She was very cold and sleeping a lot a few days leading up to her death.   Forwarded to -chart has not been updated.  STEPHANIE Molina RN

## 2024-02-22 NOTE — TELEPHONE ENCOUNTER
Could try famotidine (Pepcid) for 2 weeks and if not improving symptoms try iron every other day.  In terms of bloating, would see if famotidine (Pepcid) and every other day iron helps with those symptoms. If not, could try over the counter FDGard.    Unsure about positive FIT until see colonoscopy results.        Jemma Alvarado MD  Internal Medicine & Pediatrics  [facility]  She/her     Detail Level: Zone